# Patient Record
Sex: MALE | Race: WHITE | NOT HISPANIC OR LATINO | Employment: OTHER | URBAN - METROPOLITAN AREA
[De-identification: names, ages, dates, MRNs, and addresses within clinical notes are randomized per-mention and may not be internally consistent; named-entity substitution may affect disease eponyms.]

---

## 2018-01-23 ENCOUNTER — OFFICE VISIT (OUTPATIENT)
Dept: FAMILY MEDICINE | Facility: CLINIC | Age: 63
End: 2018-01-23
Payer: COMMERCIAL

## 2018-01-23 VITALS
BODY MASS INDEX: 26.68 KG/M2 | DIASTOLIC BLOOD PRESSURE: 84 MMHG | HEART RATE: 75 BPM | RESPIRATION RATE: 16 BRPM | TEMPERATURE: 98.2 F | SYSTOLIC BLOOD PRESSURE: 118 MMHG | OXYGEN SATURATION: 100 % | WEIGHT: 197 LBS | HEIGHT: 72 IN

## 2018-01-23 DIAGNOSIS — E78.5 HYPERLIPIDEMIA WITH TARGET LDL LESS THAN 130: ICD-10-CM
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
## 2018-01-23 DIAGNOSIS — Z00.00 ROUTINE GENERAL MEDICAL EXAMINATION AT A HEALTH CARE FACILITY: Primary | ICD-10-CM

## 2018-01-23 DIAGNOSIS — M25.512 CHRONIC PAIN OF BOTH SHOULDERS: ICD-10-CM

## 2018-01-23 DIAGNOSIS — G89.29 CHRONIC PAIN OF BOTH SHOULDERS: ICD-10-CM

## 2018-01-23 DIAGNOSIS — M25.511 CHRONIC PAIN OF BOTH SHOULDERS: ICD-10-CM

## 2018-01-23 DIAGNOSIS — Z12.5 SCREENING FOR PROSTATE CANCER: ICD-10-CM

## 2018-01-23 LAB
ANION GAP SERPL CALCULATED.3IONS-SCNC: 9 MMOL/L (ref 3–14)
BUN SERPL-MCNC: 14 MG/DL (ref 7–30)
CALCIUM SERPL-MCNC: 9 MG/DL (ref 8.5–10.1)
CHLORIDE SERPL-SCNC: 105 MMOL/L (ref 94–109)
CHOLEST SERPL-MCNC: 177 MG/DL
CO2 SERPL-SCNC: 26 MMOL/L (ref 20–32)
CREAT SERPL-MCNC: 0.97 MG/DL (ref 0.66–1.25)
GFR SERPL CREATININE-BSD FRML MDRD: 78 ML/MIN/1.7M2
GLUCOSE SERPL-MCNC: 74 MG/DL (ref 70–99)
HDLC SERPL-MCNC: 61 MG/DL
LDLC SERPL CALC-MCNC: 108 MG/DL
NONHDLC SERPL-MCNC: 116 MG/DL
POTASSIUM SERPL-SCNC: 4.1 MMOL/L (ref 3.4–5.3)
PSA SERPL-ACNC: 0.63 UG/L (ref 0–4)
SODIUM SERPL-SCNC: 140 MMOL/L (ref 133–144)
TRIGL SERPL-MCNC: 42 MG/DL

## 2018-01-23 PROCEDURE — 80048 BASIC METABOLIC PNL TOTAL CA: CPT | Performed by: PHYSICIAN ASSISTANT

## 2018-01-23 PROCEDURE — 80061 LIPID PANEL: CPT | Performed by: PHYSICIAN ASSISTANT

## 2018-01-23 PROCEDURE — 36415 COLL VENOUS BLD VENIPUNCTURE: CPT | Performed by: PHYSICIAN ASSISTANT

## 2018-01-23 PROCEDURE — 86618 LYME DISEASE ANTIBODY: CPT | Performed by: PHYSICIAN ASSISTANT

## 2018-01-23 PROCEDURE — 99396 PREV VISIT EST AGE 40-64: CPT | Performed by: PHYSICIAN ASSISTANT

## 2018-01-23 PROCEDURE — G0103 PSA SCREENING: HCPCS | Performed by: PHYSICIAN ASSISTANT

## 2018-01-23 NOTE — LETTER
January 25, 2018      Chao Quezada  [address] [place] [address]        Dear Chao,    We are writing to inform you of your test results.      Your lyme disease screening came back negative. Your PSA was nice and normal as was your cholesterol numbers and kidney function and blood sugar.       Resulted Orders   Lipid panel reflex to direct LDL Fasting   Result Value Ref Range    Cholesterol 177 <200 mg/dL    Triglycerides 42 <150 mg/dL      Comment:      Fasting specimen    HDL Cholesterol 61 >39 mg/dL    LDL Cholesterol Calculated 108 (H) <100 mg/dL      Comment:      Above desirable:  100-129 mg/dl  Borderline High:  130-159 mg/dL  High:             160-189 mg/dL  Very high:       >189 mg/dl      Non HDL Cholesterol 116 <130 mg/dL   PSA, screen   Result Value Ref Range    PSA 0.63 0 - 4 ug/L      Comment:      Assay Method:  Chemiluminescence using Siemens Vista analyzer   Basic metabolic panel  (Ca, Cl, CO2, Creat, Gluc, K, Na, BUN)   Result Value Ref Range    Sodium 140 133 - 144 mmol/L    Potassium 4.1 3.4 - 5.3 mmol/L    Chloride 105 94 - 109 mmol/L    Carbon Dioxide 26 20 - 32 mmol/L    Anion Gap 9 3 - 14 mmol/L    Glucose 74 70 - 99 mg/dL      Comment:      Fasting specimen    Urea Nitrogen 14 7 - 30 mg/dL    Creatinine 0.97 0.66 - 1.25 mg/dL    GFR Estimate 78 >60 mL/min/1.7m2      Comment:      Non  GFR Calc    GFR Estimate If Black >90 >60 mL/min/1.7m2      Comment:       GFR Calc    Calcium 9.0 8.5 - 10.1 mg/dL   Lyme Disease Latasha with reflex to WB Serum   Result Value Ref Range    Lyme Disease Antibodies Serum 0.07 0.00 - 0.89      Comment:      Negative, Absence of detectable Borrelia burdorferi antibodies. A negative   result does not exclude the possibility of Borrelia burgdorferi infection. If   early Lyme disease is suspected, a second sample should be collected and   tested 2 to 4 weeks later.         If you have any questions or concerns, please  call the clinic at the number listed above.       Sincerely,    Sony Combs PA-C/pete

## 2018-01-23 NOTE — PROGRESS NOTES
SUBJECTIVE:   CC: Chao Quezada is an 62 year old male who presents for preventative health visit.     Healthy Habits:    Do you get at least three servings of calcium containing foods daily (dairy, green leafy vegetables, etc.)? yes    Amount of exercise or daily activities, outside of work: 0 day(s) per week    Problems taking medications regularly not applicable    Medication side effects: No    Have you had an eye exam in the past two years? no    Do you see a dentist twice per year? yes    Do you have sleep apnea, excessive snoring or daytime drowsiness?no       Tick bite last summer  Noting some shoulder pain with activity.       Today's PHQ-2 Score:   PHQ-2 ( 1999 Pf[employer]) 1/23/2018 10/27/2016   Q1: Little interest or pleasure in doing things 0 0   Q2: Feeling down, depressed or hopeless 0 0   PHQ-2 Score 0 0         Abuse: Current or Past(Physical, Sexual or Emotional)- No  Do you feel safe in your environment - Yes  Social History   Substance Use Topics     Smoking status: Never Smoker     Smokeless tobacco: Never Used     Alcohol use Yes      Comment: rare      If you drink alcohol do you typically have >3 drinks per day or >7 drinks per week? No                      Last PSA:   PSA   Date Value Ref Range Status   10/27/2016 0.73 0 - 4 ug/L Final     Comment:     Assay Method:  Chemiluminescence using Siemens Vista analyzer       Reviewed orders with patient. Reviewed health maintenance and updated orders accordingly - Yes  BP Readings from Last 3 Encounters:   01/23/18 118/84   11/30/16 126/88   10/27/16 126/84    Wt Readings from Last 3 Encounters:   01/23/18 197 lb (89.4 kg)   10/27/16 202 lb 9.6 oz (91.9 kg)   02/11/16 195 lb (88.5 kg)                      Reviewed and updated as needed this visit by clinical staffTobacco  Allergies  Meds  Med Hx  Surg Hx  Fam Hx  Soc Hx        Reviewed and updated as needed this visit by Provider              ROS:  C: NEGATIVE for fever, chills, change in  weight  I: NEGATIVE for worrisome rashes, moles or lesions  E: NEGATIVE for vision changes or irritation  ENT: NEGATIVE for ear, mouth and throat problems  R: NEGATIVE for significant cough or SOB  CV: NEGATIVE for chest pain, palpitations or peripheral edema  GI: NEGATIVE for nausea, abdominal pain, heartburn, or change in bowel habits   male: negative for dysuria, hematuria, decreased urinary stream, erectile dysfunction, urethral discharge  M: NEGATIVE for significant arthralgias or myalgia  N: NEGATIVE for weakness, dizziness or paresthesias  P: NEGATIVE for changes in mood or affect    OBJECTIVE:   /84  Pulse 75  Temp 98.2  F (36.8  C) (Tympanic)  Resp 16  Ht 6' (1.829 m)  Wt 197 lb (89.4 kg)  SpO2 100%  BMI 26.72 kg/m2  EXAM:  GENERAL: healthy, alert and no distress  EYES: Eyes grossly normal to inspection, PERRL and conjunctivae and sclerae normal  HENT: ear canals and TM's normal, nose and mouth without ulcers or lesions  NECK: no adenopathy, no asymmetry, masses, or scars and thyroid normal to palpation  RESP: lungs clear to auscultation - no rales, rhonchi or wheezes  CV: regular rate and rhythm, normal S1 S2, no S3 or S4, no murmur, click or rub, no peripheral edema and peripheral pulses strong  ABDOMEN: soft, nontender, no hepatosplenomegaly, no masses and bowel sounds normal  MS: no gross musculoskeletal defects noted, no edema  SKIN: no suspicious lesions or rashes  NEURO: Normal strength and tone, mentation intact and speech normal  PSYCH: mentation appears normal, affect normal/bright  Shoulder exam shows positive impingement signs are present with pain at high arc of abduction and forward flexion on bilateral. There is tenderness of the anterolateral shoulder bilaterally.      ASSESSMENT/PLAN:       ICD-10-CM    1. Routine general medical examination at a health care facility Z00.00 Basic metabolic panel  (Ca, Cl, CO2, Creat, Gluc, K, Na, BUN)   2. Hyperlipidemia with target LDL less  than 130 E78.5 Lipid panel reflex to direct LDL Fasting   3. Screening for prostate cancer Z12.5 PSA, screen   4. Tick bite, initial encounter W57.XXXA Lyme Disease Latasha with reflex to WB Serum   5. Chronic pain of both shoulders M25.511     G89.29     M25.512        COUNSELING:  Reviewed preventive health counseling, as reflected in patient instructions       Regular exercise       Healthy diet/nutrition       reports that he has never smoked. He has never used smokeless tobacco.      Estimated body mass index is 26.72 kg/(m^2) as calculated from the following:    Height as of this encounter: 6' (1.829 m).    Weight as of this encounter: 197 lb (89.4 kg).   Weight management plan: Discussed healthy diet and exercise guidelines and patient will follow up in 12 months in clinic to re-evaluate.    Counseling Resources:  ATP IV Guidelines  Pooled Cohorts Equation Calculator  FRAX Risk Assessment  ICSI Preventive Guidelines  Dietary Guidelines for Americans, 2010  USDA's MyPlate  ASA Prophylaxis  Lung CA Screening    Sony Combs PA-C  [facility]

## 2018-01-23 NOTE — MR AVS SNAPSHOT
After Visit Summary   1/23/2018    Chao Quezada    MRN: [id number]           Patient Information     Date Of Birth          1955        Visit Information        Provider Department      1/23/2018 2:20 PM Sony Combs PA-C [facility]        Today's Diagnoses     Routine general medical examination at a health care facility    -  1    Hyperlipidemia with target LDL less than 130        Screening for prostate cancer        Tick bite, initial encounter        Chronic pain of both shoulders          Care Instructions      Preventive Health Recommendations  Male Ages 50 - 64    Yearly exam:             See your health care provider every year in order to  o   Review health changes.   o   Discuss preventive care.    o   Review your medicines if your doctor has prescribed any.     Have a cholesterol test every 5 years, or more frequently if you are at risk for high cholesterol/heart disease.     Have a diabetes test (fasting glucose) every three years. If you are at risk for diabetes, you should have this test more often.     Have a colonoscopy at age 50, or have a yearly FIT test (stool test). These exams will check for colon cancer.      Talk with your health care provider about whether or not a prostate cancer screening test (PSA) is right for you.    You should be tested each year for STDs (sexually transmitted diseases), if you re at risk.     Shots: Get a flu shot each year. Get a tetanus shot every 10 years.     Nutrition:    Eat at least 5 servings of fruits and vegetables daily.     Eat whole-grain bread, whole-wheat pasta and brown rice instead of white grains and rice.     Talk to your provider about Calcium and Vitamin D.     Lifestyle    Exercise for at least 150 minutes a week (30 minutes a day, 5 days a week). This will help you control your weight and prevent disease.     Limit alcohol to one drink per day.     No smoking.     Wear sunscreen to prevent skin cancer.  "    See your dentist every six months for an exam and cleaning.     See your eye doctor every 1 to 2 years.            Follow-ups after your visit        Who to contact     Normal or non-critical lab and imaging results will be communicated to you by CoachUphart, letter or phone within 4 business days after the clinic has received the results. If you do not hear from us within 7 days, please contact the clinic through CoachUphart or phone. If you have a critical or abnormal lab result, we will notify you by phone as soon as possible.  Submit refill requests through SkimaTalk or call your pharmacy and they will forward the refill request to us. Please allow 3 business days for your refill to be completed.          If you need to speak with a  for additional information , please call: [phone]             Additional Information About Your Visit        SkimaTalk Information     SkimaTalk lets you send messages to your doctor, view your test results, renew your prescriptions, schedule appointments and more. To sign up, go to [email or web address] . Click on \"Log in\" on the left side of the screen, which will take you to the Welcome page. Then click on \"Sign up Now\" on the right side of the page.     You will be asked to enter the access code listed below, as well as some personal information. Please follow the directions to create your username and password.     Your access code is: [email or web address]  Expires: 2018  3:03 PM     Your access code will  in 90 days. If you need help or a new code, please call your [place] clinic or [phone].        Care EveryWhere ID     This is your Care EveryWhere ID. This could be used by other organizations to access your [place] medical records  [id number]        Your Vitals Were     Pulse Temperature Respirations Height Pulse Oximetry BMI (Body Mass Index)    75 98.2  F (36.8  C) (Tympanic) 16 6' (1.829 m) 100% 26.72 kg/m2       Blood Pressure from Last 3 " Encounters:   01/23/18 118/84   11/30/16 126/88   10/27/16 126/84    Weight from Last 3 Encounters:   01/23/18 197 lb (89.4 kg)   10/27/16 202 lb 9.6 oz (91.9 kg)   02/11/16 195 lb (88.5 kg)              We Performed the Following     Basic metabolic panel  (Ca, Cl, CO2, Creat, Gluc, K, Na, BUN)     Lipid panel reflex to direct LDL Fasting     Lyme Disease Latasha with reflex to WB Serum     PSA, screen          Today's Medication Changes          These changes are accurate as of: 1/23/18  3:04 PM.  If you have any questions, ask your nurse or doctor.               Stop taking these medicines if you haven't already. Please contact your care team if you have questions.     HYDROcodone-acetaminophen 5-325 MG per tablet   Commonly known as:  NORCO   Stopped by:  Sony Combs PA-C                    Primary Care Provider Office Phone # Fax #    Sony Combs PA-C [phone]       [address] [facility] [address]        Equal Access to Services     [facility]: Hadii aad ku hadasho Soomaali, waaxda luqadaha, qaybta kaalmada adeemanuel, aisha gramajo . So [facility] [phone].    ATENCIÓN: Si habla español, tiene a ndiaye disposición servicios gratuitos de asistencia lingüística. [facility] [phone].    We comply with applicable federal civil rights laws and [place] laws. We do not discriminate on the basis of race, color, national origin, age, disability, sex, sexual orientation, or gender identity.            Thank you!     Thank you for choosing [facility]  for your care. Our goal is always to provide you with excellent care. Hearing back from our patients is one way we can continue to improve our services. Please take a few minutes to complete the written survey that you may receive in the mail after your visit with us. Thank you!             Your Updated Medication List - Protect others around you: Learn how to safely use, store and throw away your  medicines at www.disposemymeds.org.      Notice  As of 1/23/2018  3:04 PM    You have not been prescribed any medications.

## 2018-01-24 LAB — B BURGDOR IGG+IGM SER QL: 0.07 (ref 0–0.89)

## 2018-03-23 DIAGNOSIS — G44.209 TENSION HEADACHE: Primary | ICD-10-CM

## 2018-03-23 RX ORDER — BUTALBITAL, ACETAMINOPHEN AND CAFFEINE 50; 325; 40 MG/1; MG/1; MG/1
1 TABLET ORAL EVERY 4 HOURS PRN
Qty: 28 TABLET | Refills: 1 | Status: SHIPPED | OUTPATIENT
Start: 2018-03-23 | End: 2020-11-25

## 2018-04-11 ENCOUNTER — TRANSFERRED RECORDS (OUTPATIENT)
Dept: HEALTH INFORMATION MANAGEMENT | Facility: CLINIC | Age: 63
End: 2018-04-11

## 2018-09-11 DIAGNOSIS — M54.12 CERVICAL RADICULOPATHY: ICD-10-CM

## 2018-09-11 DIAGNOSIS — M50.30 DDD (DEGENERATIVE DISC DISEASE), CERVICAL: Primary | ICD-10-CM

## 2018-10-02 ENCOUNTER — OFFICE VISIT (OUTPATIENT)
Dept: NEUROSURGERY | Facility: CLINIC | Age: 63
End: 2018-10-02
Payer: COMMERCIAL

## 2018-10-02 VITALS
DIASTOLIC BLOOD PRESSURE: 88 MMHG | TEMPERATURE: 97 F | OXYGEN SATURATION: 99 % | HEART RATE: 55 BPM | SYSTOLIC BLOOD PRESSURE: 141 MMHG

## 2018-10-02 DIAGNOSIS — M54.2 NECK PAIN: Primary | ICD-10-CM

## 2018-10-02 PROCEDURE — 99243 OFF/OP CNSLTJ NEW/EST LOW 30: CPT | Performed by: NEUROLOGICAL SURGERY

## 2018-10-02 RX ORDER — METHYLPREDNISOLONE 4 MG
TABLET, DOSE PACK ORAL
Qty: 21 TABLET | Refills: 0 | Status: SHIPPED | OUTPATIENT
Start: 2018-10-02 | End: 2020-11-25

## 2018-10-02 RX ORDER — METHOCARBAMOL 750 MG/1
750 TABLET, FILM COATED ORAL 3 TIMES DAILY
Qty: 90 TABLET | Refills: 0 | Status: SHIPPED | OUTPATIENT
Start: 2018-10-02 | End: 2020-11-25

## 2018-10-02 ASSESSMENT — PAIN SCALES - GENERAL: PAINLEVEL: MILD PAIN (2)

## 2018-10-02 NOTE — LETTER
10/2/2018         RE: Chao Quezada  [address] [place] [address]        Dear Colleague,    Thank you for referring your patient, Chao Quezada, to the [facility]. Please see a copy of my visit note below.       Neurosurgery Consult Note   [facility]        CC: Neck and interscapular pain    Primary care Provider: Sony Combs    I was asked to see the patient by:  Sony Combs PA-C  [address] [place] [address]      [place]: Chao Quezada is a 62 year old male that presents to clinic with a complaint of neck and interscapular pain without radiculopathy.  Patient describes having pain in his neck that extends down to his shoulders in the interscapular region which also causes headaches.  He is tried physical therapy without traction and 2 epidural steroid injections. He has no radicular pain      Past Medical History:   Diagnosis Date     Backache, unspecified      Dermatophytosis of nail 1998     Headache        Past Surgical History:   Procedure Laterality Date     C ANESTH,KNEE JOINT; NOS  1973    right knee, with hx of dislocaton of knee cap     COLONOSCOPY WITH CO2 INSUFFLATION N/A 11/30/2016    Procedure: COLONOSCOPY WITH CO2 INSUFFLATION;  Surgeon: Duane, William Charles, MD;  Location: [facility] OR     HC COLONOSCOPY W/WO BRUSH/WASH  1/24/2005      HC EXPLORATION ANKLE JOINT  1994    right ankle fracture with pins and screws       Current Outpatient Prescriptions   Medication     butalbital-acetaminophen-caffeine (FIORICET/ESGIC) -40 MG per tablet     No current facility-administered medications for this visit.        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[employer]     Social History Main Topics     Smoking status: Never  Smoker     Smokeless tobacco: Never Used      Comment: never smoked     Alcohol use Yes      Comment: rare     Drug use: None     Sexual activity: Yes     Partners: Female     Other Topics Concern     None     Social History Narrative       Family History   Problem Relation Age of Onset     Cancer Father      throat, prostate     Prostate Cancer Father      Diabetes Mother          Review Of Systems  Skin: negative  Eyes: negative  Ears/Nose/Throat: negative  Respiratory: No shortness of breath, dyspnea on exertion, cough, or hemoptysis  Cardiovascular: negative  Gastrointestinal: negative  Genitourinary: negative  Musculoskeletal: as above  Neurologic: as above  Psychiatric: negative  Hematologic/Lymphatic/Immunologic: negative  Endocrine: negative    B/P: 141/88, T: 97, P: 55, R: Data Unavailable    Examination:  Normal affect and mood  No obvious labored respiration  No skin lesions noted   No obvious pitting edema  No abnormal psychiatric behavior  No obvious musculoskeletal abnormalities   Awake  Alert  Oriented x 3  Speech clear  Cranial nerves II - XII intact  Face symmetric  Tongue midline  Neck nontender  Normal ROM of neck  Motor exam symmetric motor strength in the bilateral upper extremities without weakness  Sensation intact  Clonus give  DTR 1 + throughout  Arias's sign negative  Spurling's sign negative  Ambulation stable    Imaging:   MRI cervical spine reveals cervical spondylosis without stenosis      Assessment/Plan:   We will try physical therapy with traction, Medrol Dosepak and a muscle relaxer to take in the evenings and night.  Patient does not need surgical intervention.        Juancarlos Benson MD, MS, FAANS  Neurosurgeon  [facility]  [address] [place] [address]  Tel [phone]    Again, thank you for allowing me to participate in the care of your patient.        Sincerely,        Juancarlos Benson MD

## 2018-10-02 NOTE — NURSING NOTE
Chao Quezada is a 62 year old male who presents for:  Chief Complaint   Patient presents with     Neurologic Problem     referral from JO[facility] for cervical DDD        Vitals:    There were no vitals filed for this visit.    BMI:  Estimated body mass index is 26.72 kg/(m^2) as calculated from the following:    Height as of 1/23/18: 6' (1.829 m).    Weight as of 1/23/18: 197 lb (89.4 kg).    Pain Score:  Data Unavailable        Jacinda Eden CMA, AAS

## 2018-10-02 NOTE — PROGRESS NOTES
Neurosurgery Consult Note   [facility]        CC: Neck and interscapular pain    Primary care Provider: Sony Combs    I was asked to see the patient by:  Sony Combs PA-C  [address] [place] [address]      [place]: Chao Quezada is a 62 year old male that presents to clinic with a complaint of neck and interscapular pain without radiculopathy.  Patient describes having pain in his neck that extends down to his shoulders in the interscapular region which also causes headaches.  He is tried physical therapy without traction and 2 epidural steroid injections. He has no radicular pain      Past Medical History:   Diagnosis Date     Backache, unspecified      Dermatophytosis of nail 1998     Headache        Past Surgical History:   Procedure Laterality Date     C ANESTH,KNEE JOINT; NOS  1973    right knee, with hx of dislocaton of knee cap     COLONOSCOPY WITH CO2 INSUFFLATION N/A 11/30/2016    Procedure: COLONOSCOPY WITH CO2 INSUFFLATION;  Surgeon: Duane, William Charles, MD;  Location: [facility] OR     HC COLONOSCOPY W/WO BRUSH/WASH  1/24/2005      HC EXPLORATION ANKLE JOINT  1994    right ankle fracture with pins and screws       Current Outpatient Prescriptions   Medication     butalbital-acetaminophen-caffeine (FIORICET/ESGIC) -40 MG per tablet     No current facility-administered medications for this visit.        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[employer]     Social History Main Topics     Smoking status: Never Smoker     Smokeless tobacco: Never Used      Comment: never smoked     Alcohol use Yes      Comment: rare     Drug use: None     Sexual activity: Yes     Partners: Female     Other Topics Concern     None     Social History Narrative        Family History   Problem Relation Age of Onset     Cancer Father      throat, prostate     Prostate Cancer Father      Diabetes Mother          Review Of Systems  Skin: negative  Eyes: negative  Ears/Nose/Throat: negative  Respiratory: No shortness of breath, dyspnea on exertion, cough, or hemoptysis  Cardiovascular: negative  Gastrointestinal: negative  Genitourinary: negative  Musculoskeletal: as above  Neurologic: as above  Psychiatric: negative  Hematologic/Lymphatic/Immunologic: negative  Endocrine: negative    B/P: 141/88, T: 97, P: 55, R: Data Unavailable    Examination:  Normal affect and mood  No obvious labored respiration  No skin lesions noted   No obvious pitting edema  No abnormal psychiatric behavior  No obvious musculoskeletal abnormalities   Awake  Alert  Oriented x 3  Speech clear  Cranial nerves II - XII intact  Face symmetric  Tongue midline  Neck nontender  Normal ROM of neck  Motor exam symmetric motor strength in the bilateral upper extremities without weakness  Sensation intact  Clonus give  DTR 1 + throughout  Arias's sign negative  Spurling's sign negative  Ambulation stable    Imaging:   MRI cervical spine reveals cervical spondylosis without stenosis      Assessment/Plan:   We will try physical therapy with traction, Medrol Dosepak and a muscle relaxer to take in the evenings and night.  Patient does not need surgical intervention.        Juancarlos Benson MD, MS, FAANS  Neurosurgeon  [facility]  [address] [place] [address]  [address]  Tel [phone]

## 2018-10-02 NOTE — PATIENT INSTRUCTIONS
1. Physical Therapy with traction  2. Rx for medrol dose pack and Robaxin    Please call our clinic with any questions or concerns: [phone]

## 2018-10-02 NOTE — MR AVS SNAPSHOT
After Visit Summary   10/2/2018    Chao Quezada    MRN: [id number]           Patient Information     Date Of Birth          1955        Visit Information        Provider Department      10/2/2018 2:50 PM Juancarlos Benson MD [facility]        Today's Diagnoses     Cervical radiculopathy    -  1      Care Instructions    1. Physical Therapy with traction  2. Rx for medrol dose pack and Robaxin    Please call our clinic with any questions or concerns: [phone]              Follow-ups after your visit        Additional Services     [facility] PT, HAND, AND CHIROPRACTIC REFERRAL       Physical Therapy, Hand Therapy and Chiropractic Care are available through:  *[facility]  *Hand Therapy (Occupational Therapy or Physical Therapy)  *[facility]    Call one number to schedule at any of the above locations: [phone].    Physical therapy, Hand therapy and/or Chiropractic care has been recommended by your physician as an excellent treatment option to reduce pain and help people return to normal activities, including sports.  Therapy and/or chiropractic care services are a great complement or alternative to expensive and invasive surgery, injections, or long-term use of prescription medications. The primary goal is to identify the underlying problem and provide you the tools to manage your condition on your own.     Please be aware that coverage of these services is subject to the terms and limitations of your health insurance plan.  Call member services at your health plan with any benefit or coverage questions.      Please bring the following to your appointment:  *Your personal calendar for scheduling future appointments  *Comfortable clothing                  Future tests that were ordered for you today     Open Future Orders        Priority Expected Expires Ordered    [facility] PT, HAND, AND CHIROPRACTIC REFERRAL Routine  10/2/2019  "10/2/2018            Who to contact     If you have questions or need follow up information about today's clinic visit or your schedule please contact [facility] directly at [phone].  Normal or non-critical lab and imaging results will be communicated to you by MyChart, letter or phone within 4 business days after the clinic has received the results. If you do not hear from us within 7 days, please contact the clinic through MyChart or phone. If you have a critical or abnormal lab result, we will notify you by phone as soon as possible.  Submit refill requests through [employer] or call your pharmacy and they will forward the refill request to us. Please allow 3 business days for your refill to be completed.          Additional Information About Your Visit        [facility] Data Source Information     [employer] lets you send messages to your doctor, view your test results, renew your prescriptions, schedule appointments and more. To sign up, go to [email or web address] . Click on \"Log in\" on the left side of the screen, which will take you to the Welcome page. Then click on \"Sign up Now\" on the right side of the page.     You will be asked to enter the access code listed below, as well as some personal information. Please follow the directions to create your username and password.     Your access code is: [email or web address]  Expires: 2018  3:27 PM     Your access code will  in 90 days. If you need help or a new code, please call your [facility] clinic or [phone].        Care EveryWhere ID     This is your Care EveryWhere ID. This could be used by other organizations to access your [facility] medical records  [id number]        Your Vitals Were     Pulse Temperature Pulse Oximetry             55 97  F (36.1  C) (Oral) 99%          Blood Pressure from Last 3 Encounters:   10/02/18 141/88   18 118/84   16 126/88    Weight from Last 3 Encounters:   18 197 lb (89.4 kg)   10/27/16 202 lb 9.6 oz " (91.9 kg)   02/11/16 195 lb (88.5 kg)                 Today's Medication Changes          These changes are accurate as of 10/2/18  3:27 PM.  If you have any questions, ask your nurse or doctor.               Start taking these medicines.        Dose/Directions    methocarbamol 750 MG tablet   Commonly known as:  ROBAXIN   Used for:  Cervical radiculopathy   Started by:  Juancarlos Benson MD        Dose:  750 mg   Take 1 tablet (750 mg) by mouth 3 times daily   Quantity:  90 tablet   Refills:  0       methylPREDNISolone 4 MG tablet   Commonly known as:  MEDROL DOSEPAK   Used for:  Cervical radiculopathy   Started by:  Juancarlos Benson MD        Follow package instructions   Quantity:  21 tablet   Refills:  0            Where to get your medicines      Some of these will need a paper prescription and others can be bought over the counter.  Ask your nurse if you have questions.     Bring a paper prescription for each of these medications     methocarbamol 750 MG tablet    methylPREDNISolone 4 MG tablet                Primary Care Provider Office Phone # Fax #    Sony Combs PA-C [phone]       [address] [facility] [address]        Equal Access to Services     [facility]: Hadii aad ku hadasho Soomaali, waaxda luqadaha, qaybta kaalmada adeegyada, aisha rgamajo . So [facility] [phone].    ATENCIÓN: Si habla español, tiene a ndiaye disposición servicios gratuitos de asistencia lingüística. [facility] [phone].    We comply with applicable federal civil rights laws and [place] laws. We do not discriminate on the basis of race, color, national origin, age, disability, sex, sexual orientation, or gender identity.            Thank you!     Thank you for choosing [facility]  for your care. Our goal is always to provide you with excellent care. Hearing back from our patients is one way we can continue to improve our services. Please take  a few minutes to complete the written survey that you may receive in the mail after your visit with us. Thank you!             Your Updated Medication List - Protect others around you: Learn how to safely use, store and throw away your medicines at www.disposemymeds.org.          This list is accurate as of 10/2/18  3:27 PM.  Always use your most recent med list.                   Brand Name Dispense Instructions for use Diagnosis    butalbital-acetaminophen-caffeine -40 MG per tablet    FIORICET/ESGIC    28 tablet    Take 1 tablet by mouth every 4 hours as needed    Tension headache       methocarbamol 750 MG tablet    ROBAXIN    90 tablet    Take 1 tablet (750 mg) by mouth 3 times daily    Cervical radiculopathy       methylPREDNISolone 4 MG tablet    MEDROL DOSEPAK    21 tablet    Follow package instructions    Cervical radiculopathy

## 2018-10-05 ENCOUNTER — TRANSFERRED RECORDS (OUTPATIENT)
Dept: HEALTH INFORMATION MANAGEMENT | Facility: CLINIC | Age: 63
End: 2018-10-05

## 2018-12-12 ENCOUNTER — TRANSFERRED RECORDS (OUTPATIENT)
Dept: HEALTH INFORMATION MANAGEMENT | Facility: CLINIC | Age: 63
End: 2018-12-12

## 2019-02-01 ENCOUNTER — TRANSFERRED RECORDS (OUTPATIENT)
Dept: HEALTH INFORMATION MANAGEMENT | Facility: CLINIC | Age: 64
End: 2019-02-01

## 2020-11-25 ENCOUNTER — OFFICE VISIT (OUTPATIENT)
Dept: FAMILY MEDICINE | Facility: CLINIC | Age: 65
End: 2020-11-25
Payer: COMMERCIAL

## 2020-11-25 VITALS
SYSTOLIC BLOOD PRESSURE: 129 MMHG | TEMPERATURE: 97.2 F | RESPIRATION RATE: 20 BRPM | DIASTOLIC BLOOD PRESSURE: 78 MMHG | WEIGHT: 201 LBS | HEIGHT: 71 IN | OXYGEN SATURATION: 95 % | HEART RATE: 52 BPM | BODY MASS INDEX: 28.14 KG/M2

## 2020-11-25 DIAGNOSIS — Z12.5 SCREENING FOR PROSTATE CANCER: ICD-10-CM

## 2020-11-25 DIAGNOSIS — B35.1 DERMATOPHYTOSIS OF NAIL: ICD-10-CM

## 2020-11-25 DIAGNOSIS — Z00.00 ENCOUNTER FOR MEDICARE ANNUAL WELLNESS EXAM: Primary | ICD-10-CM

## 2020-11-25 DIAGNOSIS — Z13.220 SCREENING FOR HYPERLIPIDEMIA: ICD-10-CM

## 2020-11-25 DIAGNOSIS — G44.209 TENSION HEADACHE: ICD-10-CM

## 2020-11-25 LAB
CHOLEST SERPL-MCNC: 203 MG/DL
HDLC SERPL-MCNC: 52 MG/DL
LDLC SERPL CALC-MCNC: 139 MG/DL
NONHDLC SERPL-MCNC: 151 MG/DL
PSA SERPL-ACNC: 0.99 UG/L (ref 0–4)
TRIGL SERPL-MCNC: 62 MG/DL

## 2020-11-25 PROCEDURE — 80061 LIPID PANEL: CPT | Performed by: PHYSICIAN ASSISTANT

## 2020-11-25 PROCEDURE — 36415 COLL VENOUS BLD VENIPUNCTURE: CPT | Performed by: PHYSICIAN ASSISTANT

## 2020-11-25 PROCEDURE — G0103 PSA SCREENING: HCPCS | Performed by: PHYSICIAN ASSISTANT

## 2020-11-25 PROCEDURE — 99397 PER PM REEVAL EST PAT 65+ YR: CPT | Performed by: PHYSICIAN ASSISTANT

## 2020-11-25 RX ORDER — BUTALBITAL, ACETAMINOPHEN AND CAFFEINE 50; 325; 40 MG/1; MG/1; MG/1
1 TABLET ORAL EVERY 4 HOURS PRN
Qty: 28 TABLET | Refills: 1 | Status: SHIPPED | OUTPATIENT
Start: 2020-11-25 | End: 2023-02-01

## 2020-11-25 RX ORDER — TERBINAFINE HYDROCHLORIDE 250 MG/1
1 TABLET ORAL DAILY
Qty: 90 TABLET | Refills: 0 | Status: SHIPPED | OUTPATIENT
Start: 2020-11-25 | End: 2023-02-01

## 2020-11-25 ASSESSMENT — MIFFLIN-ST. JEOR: SCORE: 1713.6

## 2020-11-25 NOTE — LETTER
November 30, 2020      Chao Quezada  [address] [place] [address]        Dear ,    We are writing to inform you of your test results.    Your psa came back nice and normal.   Your cholesterol numbers came back in the high normal range. To help lower your numbers , really work on a Lower fat, higher fiber diet and consistent exercise.     Resulted Orders   Lipid panel reflex to direct LDL Fasting   Result Value Ref Range    Cholesterol 203 (H) <200 mg/dL      Comment:      Desirable:       <200 mg/dl    Triglycerides 62 <150 mg/dL      Comment:      Fasting specimen    HDL Cholesterol 52 >39 mg/dL    LDL Cholesterol Calculated 139 (H) <100 mg/dL      Comment:      Above desirable:  100-129 mg/dl  Borderline High:  130-159 mg/dL  High:             160-189 mg/dL  Very high:       >189 mg/dl      Non HDL Cholesterol 151 (H) <130 mg/dL      Comment:      Above Desirable:  130-159 mg/dl  Borderline high:  160-189 mg/dl  High:             190-219 mg/dl  Very high:       >219 mg/dl     PSA, screen   Result Value Ref Range    PSA 0.99 0 - 4 ug/L      Comment:      Assay Method:  Chemiluminescence using Siemens Vista analyzer       If you have any questions or concerns, please call the clinic at the number listed above.       Sincerely,        Sony Combs PA-C/chang

## 2020-11-25 NOTE — PATIENT INSTRUCTIONS
Patient Education   Personalized Prevention Plan  You are due for the preventive services outlined below.  Your care team is available to assist you in scheduling these services.  If you have already completed any of these items, please share that information with your care team to update in your medical record.  Health Maintenance Due   Topic Date Due     HIV Screening  12/27/1969     Zoster (Shingles) Vaccine (1 of 2) 12/27/2004     Cholesterol Lab  01/23/2019     FALL RISK ASSESSMENT  12/27/2019     Pneumococcal Vaccine (1 of 1 - PPSV23) 12/27/2019     AORTIC ANEURYSM SCREENING (SYSTEM ASSIGNED)  12/27/2019     PHQ-2  01/01/2020     Discuss Advance Care Planning  04/21/2020     Flu Vaccine (1) 09/01/2020

## 2020-11-25 NOTE — PROGRESS NOTES
"  SUBJECTIVE:   Chao Quezada is a 64 year old male who presents for Preventive Visit.      Patient has been advised of split billing requirements and indicates understanding: Yes  Are you in the first 12 months of your Medicare Part B coverage?  No    Physical Health:    In general, how would you rate your overall physical health? good    Outside of work, how many days during the week do you exercise? 2-3 days/week    Outside of work, approximately how many minutes a day do you exercise?15-30 minutes    If you drink alcohol do you typically have >3 drinks per day or >7 drinks per week? Not Applicable    Do you usually eat at least 4 servings of fruit and vegetables a day, include whole grains & fiber and avoid regularly eating high fat or \"junk\" foods? Yes    Do you have any problems taking medications regularly?  No    Do you have any side effects from medications? not applicable    Needs assistance for the following daily activities: no assistance needed    Which of the following safety concerns are present in your home?  none identified     Hearing impairment: Yes, ringing in ears, had this evaluated once, some hearing issues    In the past 6 months, have you been bothered by leaking of urine? no    Mental Health:    In general, how would you rate your overall mental or emotional health? good  PHQ-2 Score:  0    Do you feel safe in your environment? Yes    Have you ever done Advance Care Planning? (For example, a Health Directive, POLST, or a discussion with a medical provider or your loved ones about your wishes): No, advance care planning information given to patient to review.  Patient plans to discuss their wishes with loved ones or provider.      Additional concerns to address?  No    Fall risk: 0  Fallen 2 or more times in the past year?: No  Any fall with injury in the past year?: No    Cognitive Screenin) Repeat 3 items (Leader, Season, Table)    2) Clock draw: NORMAL  3) 3 item recall: Recalls 3 " objects  Results: 3 items recalled: COGNITIVE IMPAIRMENT LESS LIKELY    Mini-CogTM Copyright DAVIAN Miller. Licensed by the author for use in [facility]; reprinted with permission (yelena@.[facility]). All rights reserved.      Do you have sleep apnea, excessive snoring or daytime drowsiness?: no            Reviewed and updated as needed this visit by clinical staff  Tobacco  Allergies  Meds   Med Hx  Surg Hx  Fam Hx  Soc Hx        Reviewed and updated as needed this visit by Provider                Social History     Tobacco Use     Smoking status: Never Smoker     Smokeless tobacco: Never Used     Tobacco comment: never smoked   Substance Use Topics     Alcohol use: Yes     Comment: rare                           Current providers sharing in care for this patient include:   Patient Care Team:  Sony Combs PA-C as PCP - General (Physician Assistant)  Sony Combs PA-C as Assigned PCP    The following health maintenance items are reviewed in Epic and correct as of today:  Health Maintenance   Topic Date Due     HIV SCREENING  12/27/1969     ZOSTER IMMUNIZATION (1 of 2) 12/27/2004     LIPID  01/23/2019     FALL RISK ASSESSMENT  12/27/2019     Pneumococcal Vaccine: 65+ Years (1 of 1 - PPSV23) 12/27/2019     AORTIC ANEURYSM SCREENING (SYSTEM ASSIGNED)  12/27/2019     INFLUENZA VACCINE (1) 09/01/2020     MEDICARE ANNUAL WELLNESS VISIT  11/25/2021     DTAP/TDAP/TD IMMUNIZATION (2 - Td) 10/01/2022     ADVANCE CARE PLANNING  11/25/2025     COLORECTAL CANCER SCREENING  11/30/2026     HEPATITIS C SCREENING  Completed     PHQ-2  Completed     Pneumococcal Vaccine: Pediatrics (0 to 5 Years) and At-Risk Patients (6 to 64 Years)  Aged Out     IPV IMMUNIZATION  Aged Out     MENINGITIS IMMUNIZATION  Aged Out     HEPATITIS B IMMUNIZATION  Aged Out     Lab work is in process  Labs reviewed in EPIC  BP Readings from Last 3 Encounters:   11/25/20 129/78   10/02/18 141/88   01/23/18 118/84    Wt Readings from Last  "3 Encounters:   11/25/20 91.2 kg (201 lb)   01/23/18 89.4 kg (197 lb)   10/27/16 91.9 kg (202 lb 9.6 oz)                  Patient Active Problem List   Diagnosis     Hyperlipidemia with target LDL less than 130     Past Surgical History:   Procedure Laterality Date     C ANESTH,KNEE JOINT; NOS  1973    right knee, with hx of dislocaton of knee cap     COLONOSCOPY WITH CO2 INSUFFLATION N/A 11/30/2016    Procedure: COLONOSCOPY WITH CO2 INSUFFLATION;  Surgeon: Duane, William Charles, MD;  Location: [facility] OR     HC COLONOSCOPY W/WO BRUSH/WASH  1/24/2005      HC EXPLORATION ANKLE JOINT  1994    right ankle fracture with pins and screws       Social History     Tobacco Use     Smoking status: Never Smoker     Smokeless tobacco: Never Used     Tobacco comment: never smoked   Substance Use Topics     Alcohol use: Yes     Comment: rare     Family History   Problem Relation Age of Onset     Cancer Father         throat, prostate     Prostate Cancer Father      Diabetes Mother      Cancer Brother          No current outpatient medications on file.     Allergies   Allergen Reactions     No Known Drug Allergies      Recent Labs   Lab Test 01/23/18  1511 10/27/16  0955 09/04/15  1022 04/21/15  0755   * 115* 133* 159*   HDL 61 54 51 54   TRIG 42 59 64 72   ALT  --  23  --  25   CR 0.97 0.93  --  0.92   GFRESTIMATED 78 82  --  83   GFRESTBLACK >90 >90  African American GFR Calc    --  >90   GFR Calc     POTASSIUM 4.1 4.2  --  4.3          ROS:  Constitutional, HEENT, cardiovascular, pulmonary, GI, , musculoskeletal, neuro, skin, endocrine and psych systems are negative, except as otherwise noted.    OBJECTIVE:   /78   Pulse 52   Temp 97.2  F (36.2  C) (Tympanic)   Resp 20   Ht 1.795 m (5' 10.67\")   Wt 91.2 kg (201 lb)   SpO2 95%   BMI 28.30 kg/m   Estimated body mass index is 28.3 kg/m  as calculated from the following:    Height as of this encounter: 1.795 m (5' 10.67\").    Weight as of this " "encounter: 91.2 kg (201 lb).  EXAM:   GENERAL: healthy, alert and no distress  EYES: Eyes grossly normal to inspection, PERRL and conjunctivae and sclerae normal  HENT: ear canals and TM's normal, nose and mouth without ulcers or lesions  NECK: no adenopathy, no asymmetry, masses, or scars and thyroid normal to palpation  RESP: lungs clear to auscultation - no rales, rhonchi or wheezes  CV: regular rate and rhythm, normal S1 S2, no S3 or S4, no murmur, click or rub, no peripheral edema and peripheral pulses strong  ABDOMEN: soft, nontender, no hepatosplenomegaly, no masses and bowel sounds normal  MS: no gross musculoskeletal defects noted, no edema  SKIN: no suspicious lesions or rashes  NEURO: Normal strength and tone, mentation intact and speech normal  PSYCH: mentation appears normal, affect normal/bright    Diagnostic Test Results:  Labs reviewed in Epic    ASSESSMENT / PLAN:       ICD-10-CM    1. Encounter for Medicare annual wellness exam  Z00.00    2. Screening for hyperlipidemia  Z13.220 Lipid panel reflex to direct LDL Fasting   3. Screening for prostate cancer  Z12.5 PSA, screen       Patient has been advised of split billing requirements and indicates understanding: Yes    COUNSELING:  Reviewed preventive health counseling, as reflected in patient instructions       Regular exercise       Healthy diet/nutrition    Estimated body mass index is 28.3 kg/m  as calculated from the following:    Height as of this encounter: 1.795 m (5' 10.67\").    Weight as of this encounter: 91.2 kg (201 lb).    Weight management plan: Discussed healthy diet and exercise guidelines    He reports that he has never smoked. He has never used smokeless tobacco.    Appropriate preventive services were discussed with this patient, including applicable screening as appropriate for cardiovascular disease, diabetes, osteopenia/osteoporosis, and glaucoma.  As appropriate for age/gender, discussed screening for colorectal cancer, " prostate cancer, breast cancer, and cervical cancer. Checklist reviewing preventive services available has been given to the patient.    Reviewed patients plan of care and provided an AVS. The Basic Care Plan (routine screening as documented in Health Maintenance) for Chao meets the Care Plan requirement. This Care Plan has been established and reviewed with the Patient.    Counseling Resources:  ATP IV Guidelines  Pooled Cohorts Equation Calculator  Breast Cancer Risk Calculator  BRCA-Related Cancer Risk Assessment: FHS-7 Tool  FRAX Risk Assessment  ICSI Preventive Guidelines  Dietary Guidelines for Americans, 2010  USDA's MyPlate  ASA Prophylaxis  Lung CA Screening    BRISA [facility]

## 2021-01-10 ENCOUNTER — HEALTH MAINTENANCE LETTER (OUTPATIENT)
Age: 66
End: 2021-01-10

## 2021-06-16 ENCOUNTER — ANCILLARY PROCEDURE (OUTPATIENT)
Dept: GENERAL RADIOLOGY | Facility: CLINIC | Age: 66
End: 2021-06-16
Attending: PHYSICIAN ASSISTANT
Payer: COMMERCIAL

## 2021-06-16 ENCOUNTER — OFFICE VISIT (OUTPATIENT)
Dept: FAMILY MEDICINE | Facility: CLINIC | Age: 66
End: 2021-06-16
Payer: COMMERCIAL

## 2021-06-16 VITALS
DIASTOLIC BLOOD PRESSURE: 82 MMHG | BODY MASS INDEX: 28.27 KG/M2 | HEART RATE: 54 BPM | WEIGHT: 200.8 LBS | SYSTOLIC BLOOD PRESSURE: 136 MMHG | OXYGEN SATURATION: 95 % | RESPIRATION RATE: 16 BRPM | TEMPERATURE: 97.8 F

## 2021-06-16 DIAGNOSIS — R07.9 CHEST PAIN, UNSPECIFIED TYPE: Primary | ICD-10-CM

## 2021-06-16 DIAGNOSIS — Z13.220 SCREENING FOR HYPERLIPIDEMIA: ICD-10-CM

## 2021-06-16 DIAGNOSIS — E78.5 HYPERLIPIDEMIA LDL GOAL <100: ICD-10-CM

## 2021-06-16 DIAGNOSIS — R07.9 CHEST PAIN, UNSPECIFIED TYPE: ICD-10-CM

## 2021-06-16 LAB
CHOLEST SERPL-MCNC: 203 MG/DL
HDLC SERPL-MCNC: 52 MG/DL
LDLC SERPL CALC-MCNC: 164 MG/DL
NONHDLC SERPL-MCNC: 174 MG/DL
TRIGL SERPL-MCNC: 49 MG/DL
TROPONIN I SERPL-MCNC: <0.015 UG/L (ref 0–0.04)

## 2021-06-16 PROCEDURE — 71046 X-RAY EXAM CHEST 2 VIEWS: CPT | Performed by: RADIOLOGY

## 2021-06-16 PROCEDURE — 93000 ELECTROCARDIOGRAM COMPLETE: CPT | Performed by: PHYSICIAN ASSISTANT

## 2021-06-16 PROCEDURE — 80061 LIPID PANEL: CPT | Performed by: PHYSICIAN ASSISTANT

## 2021-06-16 PROCEDURE — 99214 OFFICE O/P EST MOD 30 MIN: CPT | Performed by: PHYSICIAN ASSISTANT

## 2021-06-16 PROCEDURE — 84484 ASSAY OF TROPONIN QUANT: CPT | Performed by: PHYSICIAN ASSISTANT

## 2021-06-16 PROCEDURE — 36415 COLL VENOUS BLD VENIPUNCTURE: CPT | Performed by: PHYSICIAN ASSISTANT

## 2021-06-16 RX ORDER — NITROGLYCERIN 0.4 MG/1
TABLET SUBLINGUAL
Qty: 25 TABLET | Refills: 0 | Status: SHIPPED | OUTPATIENT
Start: 2021-06-16 | End: 2023-02-01

## 2021-06-16 NOTE — PROGRESS NOTES
Criss Guidry is a 65 year old who presents for the following health issues     HPI     Chest Pain  Onset/Duration: 1 month  Description:   Location: centered, behind breast bone  Character: tight, can be tender to the touch  Radiation: stays in chest  Duration: worse with exertion can last until he stops doing whatever he is doing and intermittent   Intensity: depends on exertion  Progression of Symptoms: worsening  Accompanying Signs & Symptoms:  Shortness of breath: YES  Sweating: no  Nausea/vomiting: no  Lightheadedness: no  Palpitations: no  Fever/Chills: no  Cough: YES           Heartburn: no  History:   Family history of heart disease: grandparents passed from heart attacks  Tobacco use: no  Previous similar symptoms: no   Precipitating factors:   Worse with exertion: YES  Worse with deep breaths: no           Related to eating: no           Better with burping: no  Therapies tried and outcome: none    Noticed 6 wks ago. Exertional. Notes a tightness, then advances to an ache.   No obvious radicular symptoms. Some mild sob. Resting helps pain to resolve. No injury.   No dizziness or palpitations. No gerd symptoms.     Review of Systems   Constitutional, HEENT, cardiovascular, pulmonary, GI, , musculoskeletal, neuro, skin, endocrine and psych systems are negative, except as otherwise noted.      Objective    /82   Pulse 54   Temp 97.8  F (36.6  C) (Tympanic)   Resp 16   Wt 91.1 kg (200 lb 12.8 oz)   SpO2 95%   BMI 28.27 kg/m    Body mass index is 28.27 kg/m .  Physical Exam   Eye exam - right eye normal lid, conjunctiva, cornea, pupil and fundus, left eye normal lid, conjunctiva, cornea, pupil and fundus.  Thyroid not palpable, not enlarged, no nodules detected.  CHEST:chest clear to IPPA, no tachypnea, retractions or cyanosis and S1, S2 normal, no murmur, no gallop, rate regular.  The abdomen is soft without tenderness, guarding, mass, rebound or organomegaly. Bowel sounds are  normal. No CVA tenderness or inguinal adenopathy noted.    Chao was seen today for chest pain.    Diagnoses and all orders for this visit:    Chest pain, unspecified type  -     XR Chest 2 Views; Future  -     EKG 12-lead complete w/read - Clinics  -     Troponin I  -     nitroGLYcerin (NITROSTAT) 0.4 MG sublingual tablet; For chest pain place 1 tablet under the tongue every 5 minutes for 3 doses. If symptoms persist 5 minutes after 1st dose call 911.  -     CARDIOLOGY EVAL ADULT REFERRAL; Future  -     aspirin (ASA) 81 MG EC tablet; Take 1 tablet (81 mg) by mouth daily    Screening for hyperlipidemia  -     Lipid panel reflex to direct LDL Fasting    Other orders  -     REVIEW OF HEALTH MAINTENANCE PROTOCOL ORDERS      work on lifestyle modification  Minimal activity until he sees cardiology.  Contact EMS of symptoms persist or worsen.

## 2021-06-18 RX ORDER — ATORVASTATIN CALCIUM 20 MG/1
20 TABLET, FILM COATED ORAL DAILY
Qty: 90 TABLET | Refills: 3 | Status: ON HOLD | OUTPATIENT
Start: 2021-06-18 | End: 2021-07-23

## 2021-06-23 ENCOUNTER — TELEPHONE (OUTPATIENT)
Dept: FAMILY MEDICINE | Facility: CLINIC | Age: 66
End: 2021-06-23

## 2021-06-23 ENCOUNTER — DOCUMENTATION ONLY (OUTPATIENT)
Dept: CARDIOLOGY | Facility: CLINIC | Age: 66
End: 2021-06-23

## 2021-06-23 DIAGNOSIS — E78.5 HYPERLIPIDEMIA LDL GOAL <100: ICD-10-CM

## 2021-06-23 DIAGNOSIS — R07.89 ATYPICAL CHEST PAIN: Primary | ICD-10-CM

## 2021-06-23 NOTE — PROGRESS NOTES
Patient showed up in clinic today under the impression that he had cardiology imaging today or a visit with Dr. Bansal. He said Sony Combs told him to come into clinic today for imaging. No imaging has been ordered by Sony Combs, a referral for cardiology was placed and patient is scheduled to see Ro Bansal on 7/21/21. I informed patient that if his chest pain is getting worse he needs to seek care in the ED otherwise he should follow up with Sony Combs for a further plan. Patient frustrated that he could not walk in and be seen today. I apologized but asked patient to follow up with Sony Combs or seek care in the ED.     Vamsi Medeiros RN   [facility] Care Coordinator  [facility]   Phone: [phone]  Fax: [phone]

## 2021-06-23 NOTE — TELEPHONE ENCOUNTER
Patient called went to have his imaging done they told him he did not have an appointment until 07/21/21 he is wondering if this should be done sooner. Please call and advise.  Ara Bustos,

## 2021-06-25 RX ORDER — ATORVASTATIN CALCIUM 20 MG/1
20 TABLET, FILM COATED ORAL DAILY
Qty: 90 TABLET | Refills: 3 | Status: ON HOLD | OUTPATIENT
Start: 2021-06-25 | End: 2021-07-23

## 2021-06-25 NOTE — TELEPHONE ENCOUNTER
I called and spoke to patient, provided him with phone number TC advises to call to schedule stress echo with  cardiology:   [phone].    He states he will call.   Advised him of atorvastatin Rx sent, he is agreeable to start, is already on the daily baby aspirin.    Has not tried the ntg yet, will consider if he has chest pain again.    He says he is on a wait list to see cardiology sooner.    Haley Schroeder RN  [facility]

## 2021-06-25 NOTE — TELEPHONE ENCOUNTER
Let Chao know I want him to have a stress test. Help him to get this scheduled asap in [place]. Also, due to rising cholesterol numbers I am starting him on lipitor. The rest of his lab work, ekg and cxr came back looking fine. I am still concerned about his exertional chest pain and am surprised cardiology to schedule him sooner. Lets see what the stress test shows. Ask him if he's tried taking a nitroglycerin tab and if he has , has it helped. Have him take an aspirin daily as well.

## 2021-07-12 ENCOUNTER — ANCILLARY PROCEDURE (OUTPATIENT)
Dept: CARDIOLOGY | Facility: CLINIC | Age: 66
End: 2021-07-12
Attending: PHYSICIAN ASSISTANT
Payer: COMMERCIAL

## 2021-07-12 DIAGNOSIS — R07.89 ATYPICAL CHEST PAIN: ICD-10-CM

## 2021-07-12 PROCEDURE — 93017 CV STRESS TEST TRACING ONLY: CPT | Performed by: INTERNAL MEDICINE

## 2021-07-12 PROCEDURE — 93325 DOPPLER ECHO COLOR FLOW MAPG: CPT | Performed by: INTERNAL MEDICINE

## 2021-07-12 PROCEDURE — 93350 STRESS TTE ONLY: CPT | Performed by: INTERNAL MEDICINE

## 2021-07-12 PROCEDURE — 93016 CV STRESS TEST SUPVJ ONLY: CPT | Performed by: INTERNAL MEDICINE

## 2021-07-12 PROCEDURE — 93321 DOPPLER ECHO F-UP/LMTD STD: CPT | Performed by: INTERNAL MEDICINE

## 2021-07-12 PROCEDURE — 93352 ADMIN ECG CONTRAST AGENT: CPT | Performed by: INTERNAL MEDICINE

## 2021-07-12 PROCEDURE — 93018 CV STRESS TEST I&R ONLY: CPT | Performed by: INTERNAL MEDICINE

## 2021-07-12 RX ADMIN — Medication 5 ML: at 14:14

## 2021-07-20 NOTE — PROGRESS NOTES
Chief Complaint: Chest pain     HPI: Chao Quezada is a 65 year old male with a past medical history of dyslpidemia who was referred to me for evaluation of chest pain by Mr. Sony Combs. He came to the appointment with his wife today.     At baseline, Mr. Quezada is a .  His work involves walking up dozens of flights of stairs a day and spending most of the day on his feet.  He notes that he was able to perform this work in an unrestricted fashion until approximately 3 months ago.  At this time, he noted that he had onset of central chest tightness when he was walking.  The chest tightness has continued to recur with activity and, in fact, become more frequent, further limiting his activity tolerance.  He notes that in the last month he has only been able to walk one flight of stairs before he has the onset of the chest tightness.  He describes this as being central but, more recently, also radiating to his left arm.  He has been able to relieve self of the chest tightness by resting.  Briefly, Mr. Quezada saw Julieta Lauryn for evaluation of this chest tightness last month.  At the time, he was given as needed nitroglycerin tablets, started on atorvastatin, and referred for an exercise stress echo.  Mr. Quezada only achieved 69% of his target heart rate but he developed wall motion abnormalities in the LAD territory.  At the stage, he was referred to cardiology for further evaluation.  Of note, Mr. Quezada notes that he has had some relief with the nitroglycerin tablets, but he does sometimes have headache with them.    At the time of the consultation, he notes an absence of chest pain at rest, dyspnea at rest or with exertion, PND, orthopnea, peripheral edema, palpitations, hemoptysis, fever, cough, lightheadedness or syncope.     A comprehensive ROS was done and the details are included above in the HPI.    Past Medical History:  - Dyslipidemia, started on statin in 06/2021   - No prior history of  hypertension, T2DM, CAD, TIA/stroke, vascular aneurysms, PAD  Past Medical History:   Diagnosis Date     Backache, unspecified      Dermatophytosis of nail 1998     Headache        Past Surgical History:    Past Surgical History:   Procedure Laterality Date     C ANESTH,KNEE JOINT; NOS  1973    right knee, with hx of dislocaton of knee cap     COLONOSCOPY WITH CO2 INSUFFLATION N/A 11/30/2016    Procedure: COLONOSCOPY WITH CO2 INSUFFLATION;  Surgeon: Duane, William Charles, MD;  Location: [facility] OR     HC EXPLORATION ANKLE JOINT  1994    right ankle fracture with pins and screws     ZZHC COLONOSCOPY W/WO BRUSH/WASH  1/24/2005        Drug History:  Home cardiac meds: Aspirin 81 mg once daily, atorvastatin 20 mg once daily.  Current Outpatient Medications   Medication Sig Dispense Refill     aspirin (ASA) 81 MG EC tablet Take 1 tablet (81 mg) by mouth daily 90 tablet 1     atorvastatin (LIPITOR) 20 MG tablet Take 1 tablet (20 mg) by mouth daily 90 tablet 3     butalbital-acetaminophen-caffeine (ESGIC) -40 MG tablet Take 1 tablet by mouth every 4 hours as needed 28 tablet 1     nitroGLYcerin (NITROSTAT) 0.4 MG sublingual tablet For chest pain place 1 tablet under the tongue every 5 minutes for 3 doses. If symptoms persist 5 minutes after 1st dose call 911. 25 tablet 0     terbinafine (LAMISIL) 250 MG tablet Take 1 tablet (250 mg) by mouth daily 90 tablet 0     atorvastatin (LIPITOR) 20 MG tablet Take 1 tablet (20 mg) by mouth daily 90 tablet 3         Family History:  - No family history of sudden cardiac death, premature CAD, valvular disorders  Family History   Problem Relation Age of Onset     Cancer Father         throat, prostate     Prostate Cancer Father      Diabetes Mother      Cancer Brother        Social History:    Social History     Tobacco Use     Smoking status: Never Smoker     Smokeless tobacco: Never Used     Tobacco comment: never smoked   Substance Use Topics     Alcohol use:  "Yes     Comment: rare       Allergies   Allergen Reactions     No Known Drug Allergies          Physical Examination:  Vitals: BP (!) 140/86 (BP Location: Left arm, Patient Position: Sitting, Cuff Size: Adult Large)   Pulse 56   Ht 1.803 m (5' 11\")   Wt 92 kg (202 lb 14.4 oz)   SpO2 97%   BMI 28.30 kg/m    BMI= Body mass index is 28.3 kg/m .    GENERAL: Healthy, alert and no distress  Eyes: No xanthelasmas.  NECK: No palpable neck masses/goitre and no evidence of retrosternal goitre.   ENT: Moist mucosal membranes.  RESPIRATORY: No signs of resp distress. Lungs CTAB.  CARDIOVASCULAR:  No JVD, regular, normal S1+S2 without added sounds or murmurs.  ABDOMEN: ND, soft, non-tender, normal bowel sounds.  EXTREMITIES: Warm, well-perfused, no edema.   NEUROLOGY: GCS 15/15, no focal deficits.  VASC: No carotid bruits. Carotid, radial, brachial, popliteal, dorsalis pedis and posterior tibialis pulses are normal in volumes and symmetric bilaterally.   SKIN: No ecchymoses, no rashes.  PSYCH: Cooperative, pleasant affect.       Investigations:  I personally viewed and interpreted the following investigations:    Labs:    CMP RESULTS:  Lab Results   Component Value Date     01/23/2018    POTASSIUM 4.1 01/23/2018    CHLORIDE 105 01/23/2018    CO2 26 01/23/2018    ANIONGAP 9 01/23/2018    GLC 74 01/23/2018    BUN 14 01/23/2018    CR 0.97 01/23/2018    GFRESTIMATED 78 01/23/2018    GFRESTBLACK >90 01/23/2018    NICKY 9.0 01/23/2018    BILITOTAL 0.5 10/27/2016    ALBUMIN 4.1 10/27/2016    ALKPHOS 65 10/27/2016    ALT 23 10/27/2016    AST 19 10/27/2016          LIPIDS:  Lab Results   Component Value Date    CHOL 203 06/16/2021     Lab Results   Component Value Date    HDL 52 06/16/2021     Lab Results   Component Value Date     06/16/2021     Lab Results   Component Value Date    TRIG 49 06/16/2021     Lab Results   Component Value Date    CHOLHDLRATIO 3.9 09/04/2015       Recent Tests:    Electrocardiogram " (07/16/2021):  Sinus bradycardia, nl axis/intervals, no ischemic changes.     Echocardiogram (07/12/2021):     Abnormal exercise stress echocardiogram.  Test terminated at 69% MPHR due to chest pain and wall motion abnormality.  Normal resting images with EF 55-60%. No resting wall motion abnormalities.  With stress no increase in LV EF,patient had chest pain and LAD territory  akinesis.  No EKG evidence for ischemia.  Blunted BP response to exercise.  Normal functional capacity.  Normal baseline screening echocardiogram with no significant valvular  abnormalities. Mild mascending aortc dilation.      Assessment and Plan:   Chao Quezada is a 65 year old male with a past medical history of dyslipidemia who presented to me for evaluation of unstable angina.    Mr. Quezada's history is classical for unstable angina.  He notes that the frequency has been increasing and that the distance that he can walk without angina is getting shorter.  In addition, he has a high-risk pattern of wall motion abnormalities on his exercise stress echocardiogram, even though he did not reach his target heart rate.  In view of this, I discussed possible the investigative strategies with him.  Given that his exercise stress echocardiogram was reflective of these wall motion abnormalities, I discussed the performance of invasive coronary angiogram with him in detail.  We discussed the intended benefits, risks, and alternatives.  After discussion of the risks (including bleeding complications, periprocedural myocardial infarction, stroke, and death, and the potential need for coronary bypass grafting), Mr. Quezada and his wife are in agreement with continuing with invasive coronary angiography. We will aim to schedule him in the next 1-2 weeks.     In an effort to mitigate his symptoms in the interim, I will start him on Imdur 30 mg once daily.  I have chosen this rather than a beta-blocker given that his heart rate was already 55 in clinic today  and he states that he is often in the 40s at rest.  I told Mr. Quezada to send me a message via [facility] if the headache is unbearable.  In this case we will consider alternate antianginals, including ranolazine.  He is already on aspirin and a moderate intensity statin.  I will await the results of the angiogram before deciding how to titrate the statin, but I told him that it will almost inevitably be increased.    Problems:  1. Unstable angina  2. Dyslipidemia  3. Aorta of 4.1 cm on TTE (indexed value of 1.9 cm/m2)  Plan:  - Start Imdur 30 mg q24h  - Continue ASA 81 mg q24h  - Coronary angiogram in the next 1-2 weeks   - Continue atorvastatin 20 mg q24h until angiogram results are available to us  - No further aortic surveillance needed given that the ascending aorta is normal in caliber when indexed to BSA  - To go to ER if pain is not relieved by NTG tablets      Chart review time: 25  Visit time: 35  Total time spent: 60  >50% of this 60-minute visit were spent with the patient on in-person counseling and discussion regarding unstable angina.     Angel Bansal [facility], [place]    Cardiovascular Division  Pager [phone]    CC  Patient Care Team:  Sony Combs PA-C as PCP - General (Physician Assistant)  Sony Combs PA-C as Assigned PCP

## 2021-07-21 ENCOUNTER — OFFICE VISIT (OUTPATIENT)
Dept: CARDIOLOGY | Facility: CLINIC | Age: 66
End: 2021-07-21
Payer: COMMERCIAL

## 2021-07-21 VITALS
OXYGEN SATURATION: 97 % | SYSTOLIC BLOOD PRESSURE: 140 MMHG | HEIGHT: 71 IN | WEIGHT: 202.9 LBS | HEART RATE: 56 BPM | DIASTOLIC BLOOD PRESSURE: 86 MMHG | BODY MASS INDEX: 28.4 KG/M2

## 2021-07-21 DIAGNOSIS — R07.9 CHEST PAIN, UNSPECIFIED TYPE: ICD-10-CM

## 2021-07-21 DIAGNOSIS — I25.110 CORONARY ARTERY DISEASE INVOLVING NATIVE CORONARY ARTERY OF NATIVE HEART WITH UNSTABLE ANGINA PECTORIS (H): Primary | ICD-10-CM

## 2021-07-21 LAB — SARS-COV-2 RNA RESP QL NAA+PROBE: NEGATIVE

## 2021-07-21 PROCEDURE — U0003 INFECTIOUS AGENT DETECTION BY NUCLEIC ACID (DNA OR RNA); SEVERE ACUTE RESPIRATORY SYNDROME CORONAVIRUS 2 (SARS-COV-2) (CORONAVIRUS DISEASE [COVID-19]), AMPLIFIED PROBE TECHNIQUE, MAKING USE OF HIGH THROUGHPUT TECHNOLOGIES AS DESCRIBED BY CMS-2020-01-R: HCPCS | Performed by: STUDENT IN AN ORGANIZED HEALTH CARE EDUCATION/TRAINING PROGRAM

## 2021-07-21 PROCEDURE — U0005 INFEC AGEN DETEC AMPLI PROBE: HCPCS | Performed by: STUDENT IN AN ORGANIZED HEALTH CARE EDUCATION/TRAINING PROGRAM

## 2021-07-21 PROCEDURE — 99205 OFFICE O/P NEW HI 60 MIN: CPT | Performed by: STUDENT IN AN ORGANIZED HEALTH CARE EDUCATION/TRAINING PROGRAM

## 2021-07-21 RX ORDER — LIDOCAINE 40 MG/G
CREAM TOPICAL
Status: CANCELLED | OUTPATIENT
Start: 2021-07-21

## 2021-07-21 RX ORDER — ISOSORBIDE MONONITRATE 30 MG/1
30 TABLET, EXTENDED RELEASE ORAL DAILY
Qty: 90 TABLET | Refills: 3 | Status: SHIPPED | OUTPATIENT
Start: 2021-07-21 | End: 2021-08-04

## 2021-07-21 RX ORDER — ASPIRIN 81 MG/1
325 TABLET ORAL DAILY
Status: CANCELLED | OUTPATIENT
Start: 2021-07-21 | End: 2021-07-23

## 2021-07-21 RX ORDER — SODIUM CHLORIDE 9 MG/ML
INJECTION, SOLUTION INTRAVENOUS CONTINUOUS
Status: CANCELLED | OUTPATIENT
Start: 2021-07-21

## 2021-07-21 ASSESSMENT — MIFFLIN-ST. JEOR: SCORE: 1722.48

## 2021-07-21 NOTE — PATIENT INSTRUCTIONS
You are scheduled for a Coronary Angiogram on Friday  at the [facility], [address] [place] [address], [place] [address]. You are to check in at the [facility] Waiting Area at 10:30am.        When you arrive you will be escorted back to the pre procedure area called 2A. Here they will insert an IV, draw labs, and obtain a short medical history. Please bring an updated list of your current medications.     A physician will come and talk with you about the procedure and obtain consent.     A nurse from the Cardiac Catheterization Lab will then escort you to the procedure area. You will be receiving sedation during the procedure so you will need someone to drive you to and from the hospital.     After the procedure you will recover for a short period (2 - 6 hours) on 3C. You will be discharged with instructions for post procedure care. However, depending on what the angiogram shows you may have to have stents placed and this might require an overnight stay. We ask that you bring a small bag of necessities for your comfort if you would need to stay overnight. DO NOT BRING ANY VALUABLES.        Pre procedure instructions:  1. Make arrangements to have a  as you will not be allowed to drive following the procedure. Someone should stay with you the night after your procedure.    2.  Do not eat any solid food or milk products for 8 hours prior to the exam. You may drink clear liquids until 2 hours prior to the exam. Clear liquids include the following: water, Jell-O, clear broth, apple juice or any non-carbonated drink that you can see through (no pop!).     3. Do not drink alcohol or smoke 24 hours prior to test.    4. You may take your other morning medications as prescribed with a sip of water. If you currently take an aspirin, continue taking your same dose.              The following is a summary of your office visit today:    Start Imdur 30 mg daily      Nurse contact information:   Cardiology Care Coordinators:  Amira Rice RN and Vamsi Medeiros, RN    [phone] Phone      [phone] Fax      HOW TO CHECK YOUR BLOOD PRESSURE AT HOME:     Avoid eating, smoking, and exercising for at least 30 minutes before taking a reading.    Be sure you have taken your BP medication at least 2-3 hours before you check it.     Sit quietly for 10 minutes before a reading.     Sit in a chair with your feet flat on the floor. Rest your  arm on a table so that the arm cuff is at the same level as your heart.    Remain still during the reading.    Record your blood pressure and pulse in a log and bring to your next appointment.     If you have had any blood work, imaging or other testing completed we will be in touch within 1-2 weeks regarding the results. If you have any questions, concerns or need to schedule a follow up, please contact us at [phone]. If you are needing refills please contact your pharmacy. For urgent after hour care please call the [facility] at [phone] or the [facility] at [phone] and ask to speak to the cardiologist on call.    It was a pleasure meeting with you today. Please let us know if there is anything else we can do for you so that we can be sure you are leaving completely satisfied with your care experience.     Your Cardiology Team at [facility]  RN Care Coordinators: Marni  CMA: Guerda

## 2021-07-21 NOTE — PROGRESS NOTES
"Chao Quezada's goals for this visit include: Discover cause of chest pain    He requests these members of his care team be copied on today's visit information: PCP    PCP: Sony Combs    Referring Provider:  Sony Combs PA-C  [id number] [place] [address]    BP (!) 140/86 (BP Location: Left arm, Patient Position: Sitting, Cuff Size: Adult Large)   Pulse 56   Ht 1.803 m (5' 11\")   Wt 92 kg (202 lb 14.4 oz)   SpO2 97%   BMI 28.30 kg/m      Do you need any medication refills at today's visit? No    Jairo Valdez, EMT  Clinic Support  [facility]    [phone]    Employed by [facility]        "

## 2021-07-22 ENCOUNTER — TELEPHONE (OUTPATIENT)
Dept: CARDIOLOGY | Facility: CLINIC | Age: 66
End: 2021-07-22

## 2021-07-22 NOTE — TELEPHONE ENCOUNTER
Left voicemail for patient to complete Travel Screen for Cardiac Cath Lab appointment on 7/23/21 and inform patient of updated Visitor Policy.  COVID negative.  Tracy Ramey RN

## 2021-07-23 ENCOUNTER — HOSPITAL ENCOUNTER (OUTPATIENT)
Facility: CLINIC | Age: 66
Discharge: HOME | End: 2021-07-23
Attending: INTERNAL MEDICINE | Admitting: INTERNAL MEDICINE
Payer: COMMERCIAL

## 2021-07-23 ENCOUNTER — APPOINTMENT (OUTPATIENT)
Dept: LAB | Facility: CLINIC | Age: 66
End: 2021-07-23
Attending: INTERNAL MEDICINE
Payer: COMMERCIAL

## 2021-07-23 ENCOUNTER — APPOINTMENT (OUTPATIENT)
Dept: MEDSURG UNIT | Facility: CLINIC | Age: 66
End: 2021-07-23
Attending: INTERNAL MEDICINE
Payer: COMMERCIAL

## 2021-07-23 VITALS
OXYGEN SATURATION: 97 % | WEIGHT: 202 LBS | BODY MASS INDEX: 28.28 KG/M2 | HEART RATE: 56 BPM | TEMPERATURE: 97.9 F | HEIGHT: 71 IN | DIASTOLIC BLOOD PRESSURE: 97 MMHG | SYSTOLIC BLOOD PRESSURE: 165 MMHG | RESPIRATION RATE: 18 BRPM

## 2021-07-23 DIAGNOSIS — Z98.890 STATUS POST CORONARY ANGIOGRAM: Primary | ICD-10-CM

## 2021-07-23 DIAGNOSIS — I25.110 CORONARY ARTERY DISEASE INVOLVING NATIVE CORONARY ARTERY OF NATIVE HEART WITH UNSTABLE ANGINA PECTORIS (H): ICD-10-CM

## 2021-07-23 DIAGNOSIS — R07.9 CHEST PAIN, UNSPECIFIED TYPE: ICD-10-CM

## 2021-07-23 DIAGNOSIS — E78.5 HYPERLIPIDEMIA LDL GOAL <100: ICD-10-CM

## 2021-07-23 LAB
ACT BLD: 227 SECONDS (ref 74–150)
ACT BLD: 247 SECONDS (ref 74–150)
ANION GAP SERPL CALCULATED.3IONS-SCNC: 5 MMOL/L (ref 3–14)
BUN SERPL-MCNC: 19 MG/DL (ref 7–30)
CALCIUM SERPL-MCNC: 8.8 MG/DL (ref 8.5–10.1)
CHLORIDE BLD-SCNC: 108 MMOL/L (ref 94–109)
CO2 SERPL-SCNC: 27 MMOL/L (ref 20–32)
CREAT SERPL-MCNC: 1.02 MG/DL (ref 0.66–1.25)
ERYTHROCYTE [DISTWIDTH] IN BLOOD BY AUTOMATED COUNT: 12.6 % (ref 10–15)
GFR SERPL CREATININE-BSD FRML MDRD: 76 ML/MIN/1.73M2
GLUCOSE BLD-MCNC: 85 MG/DL (ref 70–99)
HCT VFR BLD AUTO: 45 % (ref 40–53)
HGB BLD-MCNC: 14.8 G/DL (ref 13.3–17.7)
MCH RBC QN AUTO: 29.8 PG (ref 26.5–33)
MCHC RBC AUTO-ENTMCNC: 32.9 G/DL (ref 31.5–36.5)
MCV RBC AUTO: 91 FL (ref 78–100)
PLATELET # BLD AUTO: 275 10E3/UL (ref 150–450)
POTASSIUM BLD-SCNC: 4.2 MMOL/L (ref 3.4–5.3)
RBC # BLD AUTO: 4.97 10E6/UL (ref 4.4–5.9)
SODIUM SERPL-SCNC: 140 MMOL/L (ref 133–144)
WBC # BLD AUTO: 7 10E3/UL (ref 4–11)

## 2021-07-23 PROCEDURE — 93010 ELECTROCARDIOGRAM REPORT: CPT | Mod: 76 | Performed by: INTERNAL MEDICINE

## 2021-07-23 PROCEDURE — 250N000013 HC RX MED GY IP 250 OP 250 PS 637: Performed by: STUDENT IN AN ORGANIZED HEALTH CARE EDUCATION/TRAINING PROGRAM

## 2021-07-23 PROCEDURE — 272N000001 HC OR GENERAL SUPPLY STERILE: Performed by: INTERNAL MEDICINE

## 2021-07-23 PROCEDURE — 250N000011 HC RX IP 250 OP 636: Performed by: INTERNAL MEDICINE

## 2021-07-23 PROCEDURE — 93454 CORONARY ARTERY ANGIO S&I: CPT | Performed by: INTERNAL MEDICINE

## 2021-07-23 PROCEDURE — 999N000054 HC STATISTIC EKG NON-CHARGEABLE

## 2021-07-23 PROCEDURE — 250N000009 HC RX 250: Performed by: INTERNAL MEDICINE

## 2021-07-23 PROCEDURE — C1769 GUIDE WIRE: HCPCS | Performed by: INTERNAL MEDICINE

## 2021-07-23 PROCEDURE — 93005 ELECTROCARDIOGRAM TRACING: CPT

## 2021-07-23 PROCEDURE — C1725 CATH, TRANSLUMIN NON-LASER: HCPCS | Performed by: INTERNAL MEDICINE

## 2021-07-23 PROCEDURE — 82565 ASSAY OF CREATININE: CPT | Performed by: STUDENT IN AN ORGANIZED HEALTH CARE EDUCATION/TRAINING PROGRAM

## 2021-07-23 PROCEDURE — 99153 MOD SED SAME PHYS/QHP EA: CPT | Performed by: INTERNAL MEDICINE

## 2021-07-23 PROCEDURE — 85027 COMPLETE CBC AUTOMATED: CPT | Performed by: STUDENT IN AN ORGANIZED HEALTH CARE EDUCATION/TRAINING PROGRAM

## 2021-07-23 PROCEDURE — 85347 COAGULATION TIME ACTIVATED: CPT

## 2021-07-23 PROCEDURE — C1894 INTRO/SHEATH, NON-LASER: HCPCS | Performed by: INTERNAL MEDICINE

## 2021-07-23 PROCEDURE — C9600 PERC DRUG-EL COR STENT SING: HCPCS | Mod: LD | Performed by: INTERNAL MEDICINE

## 2021-07-23 PROCEDURE — 92921 HC PRQ TRLUML CORONARY ANGIOPLASTY ADDL BRANCH: CPT | Performed by: INTERNAL MEDICINE

## 2021-07-23 PROCEDURE — 250N000013 HC RX MED GY IP 250 OP 250 PS 637: Performed by: INTERNAL MEDICINE

## 2021-07-23 PROCEDURE — C1887 CATHETER, GUIDING: HCPCS | Performed by: INTERNAL MEDICINE

## 2021-07-23 PROCEDURE — 272N000002 HC OR SUPPLY OTHER OPNP: Performed by: INTERNAL MEDICINE

## 2021-07-23 PROCEDURE — 99152 MOD SED SAME PHYS/QHP 5/>YRS: CPT | Performed by: INTERNAL MEDICINE

## 2021-07-23 PROCEDURE — 82374 ASSAY BLOOD CARBON DIOXIDE: CPT | Performed by: STUDENT IN AN ORGANIZED HEALTH CARE EDUCATION/TRAINING PROGRAM

## 2021-07-23 PROCEDURE — 36415 COLL VENOUS BLD VENIPUNCTURE: CPT | Performed by: STUDENT IN AN ORGANIZED HEALTH CARE EDUCATION/TRAINING PROGRAM

## 2021-07-23 PROCEDURE — 999N000142 HC STATISTIC PROCEDURE PREP ONLY

## 2021-07-23 PROCEDURE — C1874 STENT, COATED/COV W/DEL SYS: HCPCS | Performed by: INTERNAL MEDICINE

## 2021-07-23 DEVICE — STENT SYNERGY DRUG ELUTING 3.00X32MM  H7493926032300: Type: IMPLANTABLE DEVICE | Status: FUNCTIONAL

## 2021-07-23 DEVICE — STENT SYNERGY DRUG ELUTING 3.50X24MM  H7493926024350: Type: IMPLANTABLE DEVICE | Status: FUNCTIONAL

## 2021-07-23 DEVICE — STENT SYNERGY DRUG ELUTING 3.50X32MM  H7493926032350: Type: IMPLANTABLE DEVICE | Status: FUNCTIONAL

## 2021-07-23 DEVICE — STENT SYNERGY DRUG ELUTING 2.50X38MM  H7493926038250: Type: IMPLANTABLE DEVICE | Status: FUNCTIONAL

## 2021-07-23 RX ORDER — IOPAMIDOL 755 MG/ML
INJECTION, SOLUTION INTRAVASCULAR
Status: DISCONTINUED | OUTPATIENT
Start: 2021-07-23 | End: 2021-07-23 | Stop reason: HOSPADM

## 2021-07-23 RX ORDER — METOPROLOL TARTRATE 1 MG/ML
5-10 INJECTION, SOLUTION INTRAVENOUS
Status: DISCONTINUED | OUTPATIENT
Start: 2021-07-23 | End: 2021-07-23

## 2021-07-23 RX ORDER — HEPARIN SODIUM 1000 [USP'U]/ML
INJECTION, SOLUTION INTRAVENOUS; SUBCUTANEOUS
Status: DISCONTINUED | OUTPATIENT
Start: 2021-07-23 | End: 2021-07-23 | Stop reason: HOSPADM

## 2021-07-23 RX ORDER — ONDANSETRON 4 MG/1
4 TABLET, ORALLY DISINTEGRATING ORAL EVERY 6 HOURS PRN
Status: DISCONTINUED | OUTPATIENT
Start: 2021-07-23 | End: 2021-07-23

## 2021-07-23 RX ORDER — ONDANSETRON 2 MG/ML
4 INJECTION INTRAMUSCULAR; INTRAVENOUS EVERY 6 HOURS PRN
Status: DISCONTINUED | OUTPATIENT
Start: 2021-07-23 | End: 2021-07-23 | Stop reason: HOSPADM

## 2021-07-23 RX ORDER — FLUMAZENIL 0.1 MG/ML
0.2 INJECTION, SOLUTION INTRAVENOUS
Status: DISCONTINUED | OUTPATIENT
Start: 2021-07-23 | End: 2021-07-23 | Stop reason: HOSPADM

## 2021-07-23 RX ORDER — NALOXONE HYDROCHLORIDE 0.4 MG/ML
0.2 INJECTION, SOLUTION INTRAMUSCULAR; INTRAVENOUS; SUBCUTANEOUS
Status: DISCONTINUED | OUTPATIENT
Start: 2021-07-23 | End: 2021-07-23

## 2021-07-23 RX ORDER — HYDRALAZINE HYDROCHLORIDE 20 MG/ML
10 INJECTION INTRAMUSCULAR; INTRAVENOUS EVERY 4 HOURS PRN
Status: DISCONTINUED | OUTPATIENT
Start: 2021-07-23 | End: 2021-07-23

## 2021-07-23 RX ORDER — NALOXONE HYDROCHLORIDE 0.4 MG/ML
0.2 INJECTION, SOLUTION INTRAMUSCULAR; INTRAVENOUS; SUBCUTANEOUS
Status: DISCONTINUED | OUTPATIENT
Start: 2021-07-23 | End: 2021-07-23 | Stop reason: HOSPADM

## 2021-07-23 RX ORDER — NICARDIPINE HYDROCHLORIDE 2.5 MG/ML
INJECTION INTRAVENOUS
Status: DISCONTINUED | OUTPATIENT
Start: 2021-07-23 | End: 2021-07-23 | Stop reason: HOSPADM

## 2021-07-23 RX ORDER — NITROGLYCERIN 5 MG/ML
VIAL (ML) INTRAVENOUS
Status: DISCONTINUED | OUTPATIENT
Start: 2021-07-23 | End: 2021-07-23 | Stop reason: HOSPADM

## 2021-07-23 RX ORDER — ONDANSETRON 4 MG/1
4 TABLET, ORALLY DISINTEGRATING ORAL EVERY 6 HOURS PRN
Status: DISCONTINUED | OUTPATIENT
Start: 2021-07-23 | End: 2021-07-23 | Stop reason: HOSPADM

## 2021-07-23 RX ORDER — ATROPINE SULFATE 0.1 MG/ML
0.5 INJECTION INTRAVENOUS
Status: DISCONTINUED | OUTPATIENT
Start: 2021-07-23 | End: 2021-07-23

## 2021-07-23 RX ORDER — FLUMAZENIL 0.1 MG/ML
0.2 INJECTION, SOLUTION INTRAVENOUS
Status: DISCONTINUED | OUTPATIENT
Start: 2021-07-23 | End: 2021-07-23

## 2021-07-23 RX ORDER — NALOXONE HYDROCHLORIDE 0.4 MG/ML
0.4 INJECTION, SOLUTION INTRAMUSCULAR; INTRAVENOUS; SUBCUTANEOUS
Status: DISCONTINUED | OUTPATIENT
Start: 2021-07-23 | End: 2021-07-23 | Stop reason: HOSPADM

## 2021-07-23 RX ORDER — NALOXONE HYDROCHLORIDE 0.4 MG/ML
0.4 INJECTION, SOLUTION INTRAMUSCULAR; INTRAVENOUS; SUBCUTANEOUS
Status: DISCONTINUED | OUTPATIENT
Start: 2021-07-23 | End: 2021-07-23

## 2021-07-23 RX ORDER — FENTANYL CITRATE 50 UG/ML
25 INJECTION, SOLUTION INTRAMUSCULAR; INTRAVENOUS
Status: DISCONTINUED | OUTPATIENT
Start: 2021-07-23 | End: 2021-07-23 | Stop reason: HOSPADM

## 2021-07-23 RX ORDER — ASPIRIN 81 MG/1
81 TABLET, CHEWABLE ORAL ONCE
Status: DISCONTINUED | OUTPATIENT
Start: 2021-07-23 | End: 2021-07-23

## 2021-07-23 RX ORDER — NITROGLYCERIN 0.4 MG/1
0.4 TABLET SUBLINGUAL EVERY 5 MIN PRN
Status: DISCONTINUED | OUTPATIENT
Start: 2021-07-23 | End: 2021-07-23 | Stop reason: HOSPADM

## 2021-07-23 RX ORDER — FENTANYL CITRATE 50 UG/ML
25 INJECTION, SOLUTION INTRAMUSCULAR; INTRAVENOUS
Status: DISCONTINUED | OUTPATIENT
Start: 2021-07-23 | End: 2021-07-23

## 2021-07-23 RX ORDER — FENTANYL CITRATE 50 UG/ML
INJECTION, SOLUTION INTRAMUSCULAR; INTRAVENOUS
Status: DISCONTINUED | OUTPATIENT
Start: 2021-07-23 | End: 2021-07-23 | Stop reason: HOSPADM

## 2021-07-23 RX ORDER — HYDRALAZINE HYDROCHLORIDE 20 MG/ML
10 INJECTION INTRAMUSCULAR; INTRAVENOUS EVERY 4 HOURS PRN
Status: DISCONTINUED | OUTPATIENT
Start: 2021-07-23 | End: 2021-07-23 | Stop reason: HOSPADM

## 2021-07-23 RX ORDER — NITROGLYCERIN 0.4 MG/1
0.4 TABLET SUBLINGUAL EVERY 5 MIN PRN
Status: DISCONTINUED | OUTPATIENT
Start: 2021-07-23 | End: 2021-07-23

## 2021-07-23 RX ORDER — ONDANSETRON 2 MG/ML
4 INJECTION INTRAMUSCULAR; INTRAVENOUS EVERY 6 HOURS PRN
Status: DISCONTINUED | OUTPATIENT
Start: 2021-07-23 | End: 2021-07-23

## 2021-07-23 RX ORDER — ATROPINE SULFATE 0.1 MG/ML
0.5 INJECTION INTRAVENOUS
Status: DISCONTINUED | OUTPATIENT
Start: 2021-07-23 | End: 2021-07-23 | Stop reason: HOSPADM

## 2021-07-23 RX ORDER — OXYCODONE HYDROCHLORIDE 5 MG/1
5 TABLET ORAL EVERY 4 HOURS PRN
Status: DISCONTINUED | OUTPATIENT
Start: 2021-07-23 | End: 2021-07-23

## 2021-07-23 RX ORDER — ATORVASTATIN CALCIUM 80 MG/1
80 TABLET, FILM COATED ORAL DAILY
Qty: 90 TABLET | Refills: 3 | Status: SHIPPED | OUTPATIENT
Start: 2021-07-23 | End: 2021-11-06

## 2021-07-23 RX ORDER — LIDOCAINE 40 MG/G
CREAM TOPICAL
Status: DISCONTINUED | OUTPATIENT
Start: 2021-07-23 | End: 2021-07-23 | Stop reason: HOSPADM

## 2021-07-23 RX ORDER — ASPIRIN 81 MG/1
81 TABLET ORAL DAILY
Status: DISCONTINUED | OUTPATIENT
Start: 2021-07-24 | End: 2021-07-23 | Stop reason: HOSPADM

## 2021-07-23 RX ORDER — OXYCODONE HYDROCHLORIDE 10 MG/1
10 TABLET ORAL EVERY 4 HOURS PRN
Status: DISCONTINUED | OUTPATIENT
Start: 2021-07-23 | End: 2021-07-23 | Stop reason: HOSPADM

## 2021-07-23 RX ORDER — OXYCODONE HYDROCHLORIDE 10 MG/1
10 TABLET ORAL EVERY 4 HOURS PRN
Status: DISCONTINUED | OUTPATIENT
Start: 2021-07-23 | End: 2021-07-23

## 2021-07-23 RX ORDER — ATORVASTATIN CALCIUM 80 MG/1
80 TABLET, FILM COATED ORAL DAILY
Status: DISCONTINUED | OUTPATIENT
Start: 2021-07-23 | End: 2021-07-23 | Stop reason: HOSPADM

## 2021-07-23 RX ORDER — OXYCODONE HYDROCHLORIDE 5 MG/1
5 TABLET ORAL EVERY 4 HOURS PRN
Status: DISCONTINUED | OUTPATIENT
Start: 2021-07-23 | End: 2021-07-23 | Stop reason: HOSPADM

## 2021-07-23 RX ORDER — ASPIRIN 81 MG/1
81 TABLET ORAL DAILY
Status: DISCONTINUED | OUTPATIENT
Start: 2021-07-24 | End: 2021-07-23

## 2021-07-23 RX ORDER — METOPROLOL TARTRATE 1 MG/ML
5-10 INJECTION, SOLUTION INTRAVENOUS
Status: DISCONTINUED | OUTPATIENT
Start: 2021-07-23 | End: 2021-07-23 | Stop reason: HOSPADM

## 2021-07-23 RX ORDER — ASPIRIN 81 MG/1
81 TABLET, CHEWABLE ORAL ONCE
Status: DISCONTINUED | OUTPATIENT
Start: 2021-07-23 | End: 2021-07-23 | Stop reason: HOSPADM

## 2021-07-23 RX ORDER — SODIUM CHLORIDE 9 MG/ML
INJECTION, SOLUTION INTRAVENOUS CONTINUOUS
Status: DISCONTINUED | OUTPATIENT
Start: 2021-07-23 | End: 2021-07-23

## 2021-07-23 RX ORDER — SODIUM CHLORIDE 9 MG/ML
INJECTION, SOLUTION INTRAVENOUS CONTINUOUS
Status: DISCONTINUED | OUTPATIENT
Start: 2021-07-23 | End: 2021-07-23 | Stop reason: HOSPADM

## 2021-07-23 RX ADMIN — ASPIRIN 325 MG: 325 TABLET, COATED ORAL at 11:08

## 2021-07-23 ASSESSMENT — MIFFLIN-ST. JEOR: SCORE: 1718.4

## 2021-07-23 NOTE — PLAN OF CARE
D/I/A: Pt roomed on 3C in [place] [facility].  Arrived via litter and accompanied by CCL RN. On monitor upon arrival. VSSA.  Sinus rhythm on monitor.  Denies pain or sob. Reviewed activity restrictions and when to notify RN, ie-changes to breathing or increased chest pressure or chest pain.  CCL access: TR Band on left radial. Site WNL.     P: Continue to monitor status.  Discharge to home once meeting criteria.

## 2021-07-23 NOTE — Clinical Note
Stent deployed in the proximal left anterior descending. Max pressure = 18 naomi. Total duration = 8 seconds.

## 2021-07-23 NOTE — DISCHARGE INSTRUCTIONS
Going Home after an Angioplasty or Stent Placement (Cardiac)  ______________________________________________      After you go home:    Have an adult stay with you for 24 hours.    Drink plenty of fluids.    You may eat your normal diet, unless your doctor tells you otherwise.    For 24 hours:    Relax and take it easy.    Do NOT smoke.    Do NOT make any important or legal decisions.    Do NOT drive or operate machines at home or at work.    Do NOT drink alcohol.    Remove the Band-Aid after 24 hours. If there is minor oozing, apply another Band-aid and remove it after 12 hours.    For 2 days, do NOT have sex or do any heavy exercise.    Do NOT take a bath, or use a hot tub or pool for at least 3 days. You may shower.    Care of wrist or arm site  It is normal to have soreness at the puncture site and mild tingling in your hand for up to 3 days.    For 2 days, do not use your hand or arm to support your weight (such as rising from a chair) or bend your wrist (such as lifting a garage door).    For 2 days, do not lift more than 5 pounds or exercise your arm (tennis, golf or bowling).    If you start bleeding from the site in your arm:    Sit down and press firmly on the site with your fingers for 10 minutes. Call your doctor as soon as you can.    If the bleeding stops, sit still and keep your wrist straight for 2 hours.    Medicines    If you have started taking Plavix or Effient, do not stop taking it until you talk to your heart doctor (cardiologist).    If you are on metformin (Glucophage), do not restart it until you have blood tests (within 2 to 3 days after discharge). When your doctor tells you it is safe, you may restart the metformin.    If you have stopped any other medicines, check with your nurse or provider about when to restart them.    Call 911 right away if you have bleeding that is heavy or does not stop.    Call your doctor if:    You have a large or growing hard lump around the site.    The  site is red, swollen, hot or tender.    Blood or fluid is draining from the site.    You have chills or a fever greater than 101 F (38 C).    Your leg or arm feels numb or cool.    You have hives, a rash or unusual itching.      [facility] [place]:  [phone] (7 days a week)

## 2021-07-23 NOTE — Clinical Note
The first balloon was inserted into the left anterior descending and proximal left anterior descending.Max pressure = 12 naomi. Total duration = 8 seconds.     Max pressure = 14 naomi. Total duration = 8 seconds.    Balloon reinflated a second time: Max pressure = 14 naomi. Total duration = 8 seconds.

## 2021-07-23 NOTE — Clinical Note
The first balloon was inserted into the left anterior descending and middle left anterior descending.Max pressure = 12 naomi. Total duration = 8 seconds.

## 2021-07-23 NOTE — Clinical Note
Max pressure = 12 naomi. Total duration = 8 seconds.     Max pressure = 16 naomi. Total duration = 8 seconds.    Balloon reinflated a second time: Max pressure = 16 naomi. Total duration = 8 seconds.

## 2021-07-23 NOTE — PROGRESS NOTES
Arrived from home for an angiogram.  VSS.  C/O terrible headache at base of posterior skull.  Ice packs with some ease.  Headache is constant and caused by the Imdur, per Chao.  H&P current.  Consent obtained.  Ready for procedure.

## 2021-07-23 NOTE — Clinical Note
Single balloon inflation. The first balloon was inserted into the left anterior descending and middle left anterior descending.Max pressure = 14 naomi. Total duration = 8 seconds.

## 2021-07-23 NOTE — Clinical Note
The first balloon was inserted into the left anterior descending.Max pressure = 16 naomi. Total duration = 8 seconds.

## 2021-07-23 NOTE — Clinical Note
The first balloon was inserted into the left anterior descending.Max pressure = 18 naomi. Total duration = 8 seconds.

## 2021-07-23 NOTE — Clinical Note
The first balloon was inserted into the circumflex and proximal circumflex.Max pressure = 14 naomi. Total duration = 10 seconds.

## 2021-07-23 NOTE — Clinical Note
The first balloon was inserted into the left anterior descending.Max pressure = 14 naomi. Total duration = 6 seconds.

## 2021-07-23 NOTE — Clinical Note
Max pressure = 12 naomi. Total duration = 8 seconds.     Max pressure = 16 naomi. Total duration = 6 seconds.    Balloon reinflated a second time: Max pressure = 16 naomi. Total duration = 6 seconds.

## 2021-07-23 NOTE — Clinical Note
The first balloon was inserted into the left anterior descending and proximal left anterior descending.Max pressure = 14 naomi. Total duration = 8 seconds.

## 2021-07-23 NOTE — Clinical Note
Single balloon inflation. The first balloon was inserted into the left anterior descending and proximal left anterior descending.Max pressure = 18 naomi. Total duration = 8 seconds.

## 2021-07-24 NOTE — PROGRESS NOTES
"D/I/A:  Patient is tolerating liquids and foods, ambulating, urinating, puncture sites are stable ( no bleeding and no hematoma) and patient has a .  A+O x4 and making needs known.  Left radial CCL access site C/D/I; no bleeding or hematoma; CMS intact.  VSSA. Provider notified regarding elevated blood pressure - see note. Sinus rhythm/beverly on monitor.  IV access removed.  Education completed and outlined in AVS or handout: medications reviewed with patient.  Questions answered prior to discharge.  Belongings returned to patient at discharge - states all belongings present.      P: Discharged to self care.  Patient to follow up with appts as per discharge instruction.    BP (!) 165/97   Pulse 56   Temp 97.9  F (36.6  C) (Oral)   Resp 18   Ht 1.803 m (5' 11\")   Wt 91.6 kg (202 lb)   SpO2 97%   BMI 28.17 kg/m        "

## 2021-07-24 NOTE — PROGRESS NOTES
"Notified cards fellow regarding elevated blood pressures. No new orders at this time. Confirmed that patient is okay to go home with current vital signs.     BP (!) 165/97   Pulse 56   Temp 97.9  F (36.6  C) (Oral)   Resp 18   Ht 1.803 m (5' 11\")   Wt 91.6 kg (202 lb)   SpO2 97%   BMI 28.17 kg/m      "

## 2021-07-26 LAB
ATRIAL RATE - MUSE: 62 BPM
DIASTOLIC BLOOD PRESSURE - MUSE: NORMAL MMHG
INTERPRETATION ECG - MUSE: NORMAL
P AXIS - MUSE: 24 DEGREES
PR INTERVAL - MUSE: 174 MS
QRS DURATION - MUSE: 98 MS
QT - MUSE: 428 MS
QTC - MUSE: 434 MS
R AXIS - MUSE: 26 DEGREES
SYSTOLIC BLOOD PRESSURE - MUSE: NORMAL MMHG
T AXIS - MUSE: 38 DEGREES
VENTRICULAR RATE- MUSE: 62 BPM

## 2021-07-27 ENCOUNTER — HOSPITAL ENCOUNTER (OUTPATIENT)
Dept: CARDIAC REHAB | Facility: CLINIC | Age: 66
End: 2021-07-27
Attending: STUDENT IN AN ORGANIZED HEALTH CARE EDUCATION/TRAINING PROGRAM
Payer: COMMERCIAL

## 2021-07-27 DIAGNOSIS — Z98.890 STATUS POST CORONARY ANGIOGRAM: ICD-10-CM

## 2021-07-27 LAB
ATRIAL RATE - MUSE: 64 BPM
DIASTOLIC BLOOD PRESSURE - MUSE: NORMAL MMHG
INTERPRETATION ECG - MUSE: NORMAL
P AXIS - MUSE: -9 DEGREES
PR INTERVAL - MUSE: 172 MS
QRS DURATION - MUSE: 106 MS
QT - MUSE: 428 MS
QTC - MUSE: 441 MS
R AXIS - MUSE: -6 DEGREES
SYSTOLIC BLOOD PRESSURE - MUSE: NORMAL MMHG
T AXIS - MUSE: 10 DEGREES
VENTRICULAR RATE- MUSE: 64 BPM

## 2021-07-27 PROCEDURE — 93797 PHYS/QHP OP CAR RHAB WO ECG: CPT | Mod: 59 | Performed by: REHABILITATION PRACTITIONER

## 2021-07-27 PROCEDURE — 93798 PHYS/QHP OP CAR RHAB W/ECG: CPT | Performed by: REHABILITATION PRACTITIONER

## 2021-08-02 ENCOUNTER — HOSPITAL ENCOUNTER (OUTPATIENT)
Dept: CARDIAC REHAB | Facility: CLINIC | Age: 66
End: 2021-08-02
Attending: STUDENT IN AN ORGANIZED HEALTH CARE EDUCATION/TRAINING PROGRAM
Payer: COMMERCIAL

## 2021-08-02 PROCEDURE — 93798 PHYS/QHP OP CAR RHAB W/ECG: CPT | Performed by: REHABILITATION PRACTITIONER

## 2021-08-04 ENCOUNTER — OFFICE VISIT (OUTPATIENT)
Dept: CARDIOLOGY | Facility: CLINIC | Age: 66
End: 2021-08-04
Payer: COMMERCIAL

## 2021-08-04 VITALS
OXYGEN SATURATION: 98 % | WEIGHT: 197.2 LBS | HEART RATE: 80 BPM | SYSTOLIC BLOOD PRESSURE: 128 MMHG | DIASTOLIC BLOOD PRESSURE: 87 MMHG | BODY MASS INDEX: 27.5 KG/M2

## 2021-08-04 DIAGNOSIS — I25.110 CORONARY ARTERY DISEASE INVOLVING NATIVE CORONARY ARTERY OF NATIVE HEART WITH UNSTABLE ANGINA PECTORIS (H): Primary | ICD-10-CM

## 2021-08-04 PROCEDURE — 99215 OFFICE O/P EST HI 40 MIN: CPT | Performed by: STUDENT IN AN ORGANIZED HEALTH CARE EDUCATION/TRAINING PROGRAM

## 2021-08-04 RX ORDER — LISINOPRIL 5 MG/1
5 TABLET ORAL DAILY
Qty: 90 TABLET | Refills: 3 | Status: SHIPPED | OUTPATIENT
Start: 2021-08-04 | End: 2021-12-08

## 2021-08-04 NOTE — PROGRESS NOTES
Chief Complaint: Chest pain     HPI: Chao Quezada is a 65 year old male with a past medical history of dyslpidemia who was referred to me for evaluation of chest pain by Mr. Sony Combs. He came to the appointment with his wife today.     At baseline, Mr. Quezada is a .  His work involves walking up dozens of flights of stairs a day and spending most of the day on his feet.  He notes that he was able to perform this work in an unrestricted fashion until approximately 3 months ago.  At this time, he noted that he had onset of central chest tightness when he was walking.  The chest tightness has continued to recur with activity and, in fact, become more frequent, further limiting his activity tolerance.  He notes that in the last month he has only been able to walk one flight of stairs before he has the onset of the chest tightness.  He describes this as being central but, more recently, also radiating to his left arm.  He has been able to relieve self of the chest tightness by resting.  Briefly, Mr. Quezada saw Julieta Lauryn for evaluation of this chest tightness last month.  At the time, he was given as needed nitroglycerin tablets, started on atorvastatin, and referred for an exercise stress echo.  Mr. Quezada only achieved 69% of his target heart rate but he developed wall motion abnormalities in the LAD territory.  At the stage, he was referred to cardiology for further evaluation.  Of note, Mr. Quezada notes that he has had some relief with the nitroglycerin tablets, but he does sometimes have headache with them.    At the time of the consultation, he notes an absence of chest pain at rest, dyspnea at rest or with exertion, PND, orthopnea, peripheral edema, palpitations, hemoptysis, fever, cough, lightheadedness or syncope.     A comprehensive ROS was done and the details are included above in the HPI.    Interval History 08/04/2021:  Mr. Quezada underwent coronary angiography and PCI to the LAD and  LCx on 07/23/21. Since then, he has not had a single episode of angina. He has been walking his dog and playing with his three grandchildren regularly and also pursuing formal cardiac rehab. No sequelae of bleeding. He has had multiple episodes of headache with ISMN and, while this has improved somewhat, this continues on a daily basis.     A comprehensive ROS was done and was otherwise negative.     Past Medical History:  - Dyslipidemia, started on statin in 06/2021   - No prior history of hypertension, T2DM, CAD, TIA/stroke, vascular aneurysms, PAD  Past Medical History:   Diagnosis Date     Backache, unspecified      Dermatophytosis of nail 1998     Headache        Past Surgical History:    Past Surgical History:   Procedure Laterality Date     C ANESTH,KNEE JOINT; NOS  1973    right knee, with hx of dislocaton of knee cap     COLONOSCOPY WITH CO2 INSUFFLATION N/A 11/30/2016    Procedure: COLONOSCOPY WITH CO2 INSUFFLATION;  Surgeon: Duane, William Charles, MD;  Location: [facility] OR     CV CORONARY ANGIOGRAM N/A 7/23/2021    Procedure: CV CORONARY ANGIOGRAM;  Surgeon: Kale Singh MD;  Location: [facility] CARDIAC CATH LAB     CV PCI ANGIOPLASTY N/A 7/23/2021    Procedure: Percutaneous Transluminal Angioplasty;  Surgeon: Kale Singh MD;  Location: [facility] CARDIAC CATH LAB     CV PCI STENT DRUG ELUTING N/A 7/23/2021    Procedure: Percutaneous Coronary Intervention Stent Drug Eluting;  Surgeon: Kale Singh MD;  Location:  [facility]     HC EXPLORATION ANKLE JOINT  1994    right ankle fracture with pins and screws     ZZ COLONOSCOPY W/WO BRUSH/WASH  1/24/2005        Drug History:  Home cardiac meds: Aspirin 81 mg once daily, atorvastatin 80 mg once daily, ISMN 30 mg q24h, ticagrelor 90 mg BID   Current Outpatient Medications   Medication Sig Dispense Refill     aspirin (ASA) 81 MG EC tablet Take 1 tablet (81 mg) by mouth daily 90 tablet 1     atorvastatin (LIPITOR) 80 MG tablet Take 1 tablet  (80 mg) by mouth daily 90 tablet 3     lisinopril (ZESTRIL) 5 MG tablet Take 1 tablet (5 mg) by mouth daily 90 tablet 3     nitroGLYcerin (NITROSTAT) 0.4 MG sublingual tablet For chest pain place 1 tablet under the tongue every 5 minutes for 3 doses. If symptoms persist 5 minutes after 1st dose call 911. 25 tablet 0     terbinafine (LAMISIL) 250 MG tablet Take 1 tablet (250 mg) by mouth daily 90 tablet 0     ticagrelor (BRILINTA) 90 MG tablet Take 1 tablet (90 mg) by mouth 2 times daily Start tomorrow morning. 180 tablet 3     butalbital-acetaminophen-caffeine (ESGIC) -40 MG tablet Take 1 tablet by mouth every 4 hours as needed (Patient not taking: Reported on 8/4/2021) 28 tablet 1         Family History:  - No family history of sudden cardiac death, premature CAD, valvular disorders  Family History   Problem Relation Age of Onset     Cancer Father         throat, prostate     Prostate Cancer Father      Diabetes Mother      Cancer Brother        Social History:    Social History     Tobacco Use     Smoking status: Never Smoker     Smokeless tobacco: Never Used     Tobacco comment: never smoked   Substance Use Topics     Alcohol use: Yes     Comment: rare       Allergies   Allergen Reactions     No Known Drug Allergies          Physical Examination:  Vitals: /87 (BP Location: Left arm, Patient Position: Sitting, Cuff Size: Adult Regular)   Pulse 80   Wt 89.4 kg (197 lb 3.2 oz)   SpO2 98%   BMI 27.50 kg/m    BMI= Body mass index is 27.5 kg/m .    GENERAL: Healthy, alert and no distress  Eyes: No xanthelasmas.  NECK: No palpable neck masses/goitre and no evidence of retrosternal goitre.   ENT: Moist mucosal membranes.  RESPIRATORY: No signs of resp distress. Lungs CTAB.  CARDIOVASCULAR:  No JVD, regular, normal S1+S2 without added sounds or murmurs.  ABDOMEN: ND, soft, non-tender, normal bowel sounds.  EXTREMITIES: Warm, well-perfused, no edema.   NEUROLOGY: GCS 15/15, no focal  deficits.  VASC: No carotid bruits. LRA access site with minor bruising. C4-c8 dermatomes NVI bilaterally. No thrill/bruit over access sites.   SKIN: No ecchymoses, no rashes.  PSYCH: Cooperative, pleasant affect.       Investigations:  I personally viewed and interpreted the following investigations:    Labs:    CMP RESULTS:  Lab Results   Component Value Date     07/23/2021     01/23/2018    POTASSIUM 4.2 07/23/2021    POTASSIUM 4.1 01/23/2018    CHLORIDE 108 07/23/2021    CHLORIDE 105 01/23/2018    CO2 27 07/23/2021    CO2 26 01/23/2018    ANIONGAP 5 07/23/2021    ANIONGAP 9 01/23/2018    GLC 85 07/23/2021    GLC 74 01/23/2018    BUN 19 07/23/2021    BUN 14 01/23/2018    CR 1.02 07/23/2021    CR 0.97 01/23/2018    GFRESTIMATED 76 07/23/2021    GFRESTIMATED 78 01/23/2018    GFRESTBLACK >90 01/23/2018    NICKY 8.8 07/23/2021    NICKY 9.0 01/23/2018    BILITOTAL 0.5 10/27/2016    ALBUMIN 4.1 10/27/2016    ALKPHOS 65 10/27/2016    ALT 23 10/27/2016    AST 19 10/27/2016          LIPIDS:  Lab Results   Component Value Date    CHOL 203 06/16/2021     Lab Results   Component Value Date    HDL 52 06/16/2021     Lab Results   Component Value Date     06/16/2021     Lab Results   Component Value Date    TRIG 49 06/16/2021     Lab Results   Component Value Date    CHOLHDLRATIO 3.9 09/04/2015       Recent Tests:    Electrocardiogram (07/16/2021):  Sinus bradycardia, nl axis/intervals, no ischemic changes.     Echocardiogram (07/12/2021):     Abnormal exercise stress echocardiogram.  Test terminated at 69% MPHR due to chest pain and wall motion abnormality.  Normal resting images with EF 55-60%. No resting wall motion abnormalities.  With stress no increase in LV EF,patient had chest pain and LAD territory  akinesis.  No EKG evidence for ischemia.  Blunted BP response to exercise.  Normal functional capacity.  Normal baseline screening echocardiogram with no significant valvular  abnormalities. Mild mascending  aortc dilation.    Coronary Angiogram 07/23/2021:      >>LM: No significant lesion    >>LAD: Diffusely diseased. Proximal LAD has 95% lesion followed by a long 50-60% mid LAD lesion. D1 ostium has a 40% lesion.    >>LCX: 95% lesion in the proximal segment.    >>RCA: 20% lesion in the proximal segment.    S/p successful PCI of the proximal to mid LAD using 3 overlapping stents (Synergy JUSTINE 3.5*32 mm, 3.0*32 mm, 2.5*38 mm from proximal to distal) - Procedure details below    S/p successful PCI of the LCX using a Synergy JUSTINE 3.5*24 mm - Procedure details below    Assessment and Plan:   Chao Quezada is an extremely pleasant 65-year old male with a past medical history of dyslipidemia who presented to me for evaluation of unstable angina.    Mr. Quezada's management will focus on lifestyle management and medical therapy. I talked to him at length about the medical therapy option Since his BP was in the stage I phase previously and he has had an ACS, we decided to start lisinopril 5 mg q24h today.  He is hesitant to start metoprolol for anginal prophylaxis and/or prognostic benefit because he is had bradycardia in the past.  Moreover, he continues to have headache with ISMN without any real symptomatic benefit from isosorbide mononitrate given that his angina is been completely relieved by the PCI.  In view of this, we will stop his Imdur today.    I spoke to Mr. Quezada at length about things and exercise regimen and dietary management. He will continue to increase his exercise level until he is doing 30 minutes of moderate exercise 5 times weekly.  I also asked him to consider the Mediterranean diet, but on a more basic level, increase his intake of fruits and vegetables on a daily basis and consider intake of more lean meats.     I would kindly ask that Mr. Quezada's medical team reach out to me before stopping or interrupting his antiplatelet therapy before 07/24/2022 given the high-risk nature of this.      Problems:  1. Coronary artery disease with PCI to LAD and LCx for ACS (unstable angina) on 07/23/2021   2. Dyslipidemia  3. Ascending aorta of 4.1 cm on TTE (indexed value of 1.9 cm/m2)  4. Hypertension, stage I       Plan:  - Start lisinopril 5 mg q24h for stage I hypertension and prognostic benefit after ACS  - Continue ASA 81 mg q24h lifelong   - Consider starting metoprolol tartrate at next visit for anginal prophylaxis if HR permits   - Continue ticagrelor 90 mg BID till and including 07/24/2022 in an uninterrupted fashion   - Continue atorvastatin 80 mg q24h   - No further aortic surveillance needed given that the ascending aorta is normal in caliber when indexed to BSA  - RTC three months to evaluate symptoms     Chart review time: 10  Visit time: 45  Total time spent: 55  >50% of this 55-minute visit were spent with the patient on in-person counseling and discussion regarding ACS, angina, hypertension.      Lucretia Soto, MS    Cardiovascular Division  Pager [phone]    CC  Patient Care Team:  Sony Combs PA-C as PCP - General (Physician Assistant)  Sony Combs PA-C as Assigned PCP  Angel Bansal MD as Assigned Heart and Vascular Provider

## 2021-08-04 NOTE — PATIENT INSTRUCTIONS
You were seen at the [facility] clinic today.   You saw Dr. Angel Bansal, [employer], [place].    The following is a summary of your office visit today:    1. Start lisinopril 5 mg once daily. Message me if you have a dry cough.  2. Stop taking isosorbide mononitrate.  3. Continue regular exercise 30 minutes, five days a week. Continue cardiac rehab.  4. Reduce dietary sugars, increase fruits and vegetables  5. Follow-up with me three months    Nurse contact information:    Cardiology Care Coordinators: Amira Rice RN and Vamsi Medeiros RN      Phone: [phone]   Fax: [phone]      HOW TO CHECK YOUR BLOOD PRESSURE AT HOME:     Avoid eating, smoking, and exercising for at least 30 minutes before taking a reading.    Be sure you have taken your BP medication at least 2-3 hours before you check it.     Sit quietly for 10 minutes before a reading.     Sit in a chair with your feet flat on the floor. Rest your  arm on a table so that the arm cuff is at the same level as your heart.    Remain still during the reading.    Record your blood pressure and pulse in a log and bring to your next appointment.     If you have had any blood work, imaging or other testing completed we will be in touch within 1-2 weeks regarding the results. If you have any questions, concerns or need to schedule a follow up, please contact us at [phone]. If you are needing refills please contact your pharmacy. For urgent after hour care please call the [facility] at [phone] or the [facility] at [phone] and ask to speak to the on-call Cardiologist.    It was a pleasure meeting with you today. Please let us know if there is anything else we can do for you so that we can be sure you are leaving completely satisfied with your care experience.     Your Cardiology Team at [facility]  RN Care Coordinators: Marni BALDWIN:  Guerda

## 2021-08-09 ENCOUNTER — HOSPITAL ENCOUNTER (OUTPATIENT)
Dept: CARDIAC REHAB | Facility: CLINIC | Age: 66
End: 2021-08-09
Attending: STUDENT IN AN ORGANIZED HEALTH CARE EDUCATION/TRAINING PROGRAM
Payer: COMMERCIAL

## 2021-08-09 PROCEDURE — 93798 PHYS/QHP OP CAR RHAB W/ECG: CPT | Performed by: REHABILITATION PRACTITIONER

## 2021-08-16 ENCOUNTER — HOSPITAL ENCOUNTER (OUTPATIENT)
Dept: CARDIAC REHAB | Facility: CLINIC | Age: 66
End: 2021-08-16
Attending: STUDENT IN AN ORGANIZED HEALTH CARE EDUCATION/TRAINING PROGRAM
Payer: COMMERCIAL

## 2021-08-16 PROCEDURE — 93798 PHYS/QHP OP CAR RHAB W/ECG: CPT

## 2021-08-23 ENCOUNTER — HOSPITAL ENCOUNTER (OUTPATIENT)
Dept: CARDIAC REHAB | Facility: CLINIC | Age: 66
End: 2021-08-23
Attending: STUDENT IN AN ORGANIZED HEALTH CARE EDUCATION/TRAINING PROGRAM
Payer: COMMERCIAL

## 2021-08-23 PROCEDURE — 93798 PHYS/QHP OP CAR RHAB W/ECG: CPT | Performed by: REHABILITATION PRACTITIONER

## 2021-08-24 ENCOUNTER — HOSPITAL ENCOUNTER (OUTPATIENT)
Dept: CARDIAC REHAB | Facility: CLINIC | Age: 66
End: 2021-08-24
Attending: STUDENT IN AN ORGANIZED HEALTH CARE EDUCATION/TRAINING PROGRAM
Payer: COMMERCIAL

## 2021-08-24 PROCEDURE — 93798 PHYS/QHP OP CAR RHAB W/ECG: CPT | Performed by: REHABILITATION PRACTITIONER

## 2021-10-07 NOTE — PROGRESS NOTES
1. Upper respiratory tract infection, unspecified type  - azithromycin (ZITHROMAX) 250 MG tablet; Take 2 tablets (500 mg) by mouth daily for 1 day, THEN 1 tablet (250 mg) daily for 4 days.  Dispense: 6 tablet; Refill: 0  - guaiFENesin-codeine (GUAIFENESIN AC) 100-10 MG/5ML syrup; Take 5 mLs by mouth nightly as needed for congestion  Dispense: 118 mL; Refill: 0  - Symptomatic COVID-19 Virus (Coronavirus) by PCR Nose    2. Coronary artery disease involving native coronary artery of native heart with unstable angina pectoris (H)  - Lipid Profile      Patient Active Problem List    Diagnosis Date Noted     Status post coronary angiogram 07/23/2021     Priority: Medium     Coronary artery disease involving native coronary artery of native heart with unstable angina pectoris (H) 07/21/2021     Priority: Medium     Added automatically from request for surgery [id number]       Chest pain, unspecified type 07/21/2021     Priority: Medium     Added automatically from request for surgery [id number]       Hyperlipidemia with target LDL less than 130 04/21/2015     Priority: Medium     Diagnosis updated by automated process. Provider to review and confirm.       Current Outpatient Medications   Medication     aspirin (ASA) 81 MG EC tablet     atorvastatin (LIPITOR) 80 MG tablet     butalbital-acetaminophen-caffeine (ESGIC) -40 MG tablet     lisinopril (ZESTRIL) 5 MG tablet     nitroGLYcerin (NITROSTAT) 0.4 MG sublingual tablet     terbinafine (LAMISIL) 250 MG tablet     ticagrelor (BRILINTA) 90 MG tablet     No current facility-administered medications for this visit.       Criss Guidry is a 65 year old who presents for the following health issues     HPI     *patient requesting lipid profile    Acute Illness  Acute illness concerns: cough  Onset/Duration: 3 weeks  Symptoms:  Fever: no  Chills/Sweats: no  Headache (location?): no  Sinus Pressure: YES  Conjunctivitis:  no  Ear Pain: no  Rhinorrhea: YES  Congestion:  YES  Sore Throat: YES  Cough: YES-non-productive, waxing and waning over time  Wheeze: no  Decreased Appetite: no  Nausea: no  Vomiting: no  Diarrhea: no  Dysuria/Freq.: no  Dysuria or Hematuria: no  Fatigue/Achiness: no  Sick/Strep Exposure: no  Therapies tried and outcome: nyquil- somewhat helpful       1. Cough: Ongoing for the past 3 weeks.  Mild fever.  COVID-19 testing was negative two weeks ago.  Other sick contact.  Last weekend lost voice.  Lasting 2 days.  Dry cough.  Congestion.  Nasal discharge.  No recent fever.  Normal appetite.  Energy is normal.  Tried OTC liquid with minimal improvement.  No fevers or chills.  Non-smoker.     Review of Systems   Constitutional: Negative for chills and fever.   HENT: Positive for congestion. Negative for ear pain, hearing loss and sore throat.    Respiratory: Positive for cough. Negative for shortness of breath.    Cardiovascular: Negative for chest pain, palpitations and peripheral edema.   Musculoskeletal: Negative for arthralgias, joint swelling and myalgias.   Skin: Negative for rash.   Neurological: Negative for dizziness, weakness, headaches and paresthesias.   Psychiatric/Behavioral: Negative for mood changes. The patient is not nervous/anxious.          Objective    /72 (BP Location: Right arm, Cuff Size: Adult Large)   Pulse (!) 48   Temp 97.5  F (36.4  C) (Tympanic)   Wt 87.5 kg (192 lb 12.8 oz)   SpO2 99%   BMI 26.89 kg/m    Body mass index is 26.89 kg/m .  Physical Exam  Constitutional:       General: He is not in acute distress.     Appearance: He is well-developed.   HENT:      Head: Normocephalic and atraumatic.      Nose: Nose normal.   Eyes:      Conjunctiva/sclera: Conjunctivae normal.   Neck:      Trachea: No tracheal deviation.   Cardiovascular:      Rate and Rhythm: Normal rate and regular rhythm.      Heart sounds: Normal heart sounds.   Pulmonary:      Effort: Pulmonary effort is normal.      Breath sounds: No wheezing.    Musculoskeletal:         General: Normal range of motion.      Cervical back: Normal range of motion.   Skin:     Findings: No erythema or rash.   Neurological:      Mental Status: He is alert and oriented to person, place, and time.   Psychiatric:         Behavior: Behavior normal.

## 2021-10-08 ENCOUNTER — OFFICE VISIT (OUTPATIENT)
Dept: FAMILY MEDICINE | Facility: CLINIC | Age: 66
End: 2021-10-08
Payer: COMMERCIAL

## 2021-10-08 VITALS
WEIGHT: 192.8 LBS | SYSTOLIC BLOOD PRESSURE: 114 MMHG | HEART RATE: 48 BPM | BODY MASS INDEX: 26.89 KG/M2 | DIASTOLIC BLOOD PRESSURE: 72 MMHG | TEMPERATURE: 97.5 F | OXYGEN SATURATION: 99 %

## 2021-10-08 DIAGNOSIS — J06.9 UPPER RESPIRATORY TRACT INFECTION, UNSPECIFIED TYPE: Primary | ICD-10-CM

## 2021-10-08 DIAGNOSIS — I25.110 CORONARY ARTERY DISEASE INVOLVING NATIVE CORONARY ARTERY OF NATIVE HEART WITH UNSTABLE ANGINA PECTORIS (H): ICD-10-CM

## 2021-10-08 LAB
CHOLEST SERPL-MCNC: 113 MG/DL
FASTING STATUS PATIENT QL REPORTED: YES
HDLC SERPL-MCNC: 49 MG/DL
LDLC SERPL CALC-MCNC: 55 MG/DL
NONHDLC SERPL-MCNC: 64 MG/DL
TRIGL SERPL-MCNC: 43 MG/DL

## 2021-10-08 PROCEDURE — 36415 COLL VENOUS BLD VENIPUNCTURE: CPT | Performed by: FAMILY MEDICINE

## 2021-10-08 PROCEDURE — 80061 LIPID PANEL: CPT | Performed by: FAMILY MEDICINE

## 2021-10-08 PROCEDURE — U0003 INFECTIOUS AGENT DETECTION BY NUCLEIC ACID (DNA OR RNA); SEVERE ACUTE RESPIRATORY SYNDROME CORONAVIRUS 2 (SARS-COV-2) (CORONAVIRUS DISEASE [COVID-19]), AMPLIFIED PROBE TECHNIQUE, MAKING USE OF HIGH THROUGHPUT TECHNOLOGIES AS DESCRIBED BY CMS-2020-01-R: HCPCS | Performed by: FAMILY MEDICINE

## 2021-10-08 PROCEDURE — 99213 OFFICE O/P EST LOW 20 MIN: CPT | Performed by: FAMILY MEDICINE

## 2021-10-08 RX ORDER — CODEINE PHOSPHATE/GUAIFENESIN 10-100MG/5
5 LIQUID (ML) ORAL
Qty: 118 ML | Refills: 0 | Status: SHIPPED | OUTPATIENT
Start: 2021-10-08 | End: 2023-02-01

## 2021-10-08 RX ORDER — AZITHROMYCIN 250 MG/1
TABLET, FILM COATED ORAL
Qty: 6 TABLET | Refills: 0 | Status: SHIPPED | OUTPATIENT
Start: 2021-10-08 | End: 2021-10-13

## 2021-10-08 ASSESSMENT — PAIN SCALES - GENERAL: PAINLEVEL: NO PAIN (0)

## 2021-10-08 ASSESSMENT — ENCOUNTER SYMPTOMS: COUGH: 1

## 2021-10-08 NOTE — PATIENT INSTRUCTIONS
Brent Guidry,    Thank you for allowing [facility] to manage your care.    I sent your prescriptions to your pharmacy.    If you have any questions or concerns, please feel free to call us at [phone].    Sincerely,    Dr. Radford    Did you know?      You can schedule a video visit for follow-up appointments as well as future appointments for certain conditions.  Please see the below link.     [email or web address]    If you have not already done so,  I encourage you to sign up for [email or web address]).  This will allow you to review your results, securely communicate with a provider, and schedule virtual visits as well.

## 2021-10-08 NOTE — LETTER
October 13, 2021      Chao Quezada  [address] [place] [address]        Dear Mr. Quezada,    We are writing to inform you of your test results.    Your recent COVID-19 PCR (active form of infection) screening is negative.      This is reassuring news.      Resulted Orders   Symptomatic COVID-19 Virus (Coronavirus) by PCR Nose   Result Value Ref Range    SARS CoV2 PCR Negative Negative      Comment:      NEGATIVE: SARS-CoV-2 (COVID-19) RNA not detected, presumed negative.    Narrative    Testing was performed using the [employer] SARS-CoV-2 Assay on the  [employer] Instrument System. Additional information about this  Emergency Use Authorization (EUA) assay can be found via the Lab  Guide. This test should be ordered for the detection of SARS-CoV-2 in  individuals who meet SARS-CoV-2 clinical and/or epidemiological  criteria. Test performance is unknown in asymptomatic patients. This  test is for in vitro diagnostic use under the FDA EUA for  laboratories certified under CLIA to perform high complexity testing.  This test has not been FDA cleared or approved. A negative result  does not rule out the presence of PCR inhibitors in the specimen or  target RNA in concentration below the limit of detection for the  assay. The possibility of a false negative should be considered if  the patient's recent exposure or clinical presentation suggests  COVID-19. This test was validated by the [facility]. This laboratory is certified under  the Clinical Laboratory Improvement Amendments of 1988 (CLIA-88) as  qualified to perform high complexity laboratory testing.               If you have any questions or concerns, please call the clinic at the number listed above.       Sincerely,      Denis Radford, DO

## 2021-10-11 LAB — SARS-COV-2 RNA RESP QL NAA+PROBE: NEGATIVE

## 2021-10-11 ASSESSMENT — ENCOUNTER SYMPTOMS
DIZZINESS: 0
MYALGIAS: 0
PALPITATIONS: 0
CHILLS: 0
SORE THROAT: 0
HEADACHES: 0
ARTHRALGIAS: 0
FEVER: 0
PARESTHESIAS: 0
JOINT SWELLING: 0
WEAKNESS: 0
NERVOUS/ANXIOUS: 0
SHORTNESS OF BREATH: 0

## 2021-10-23 ENCOUNTER — HEALTH MAINTENANCE LETTER (OUTPATIENT)
Age: 66
End: 2021-10-23

## 2021-11-04 DIAGNOSIS — I25.110 CORONARY ARTERY DISEASE INVOLVING NATIVE CORONARY ARTERY OF NATIVE HEART WITH UNSTABLE ANGINA PECTORIS (H): ICD-10-CM

## 2021-11-06 RX ORDER — ATORVASTATIN CALCIUM 80 MG/1
80 TABLET, FILM COATED ORAL DAILY
Qty: 90 TABLET | Refills: 2 | Status: SHIPPED | OUTPATIENT
Start: 2021-11-06 | End: 2022-08-03

## 2021-11-08 ENCOUNTER — ALLIED HEALTH/NURSE VISIT (OUTPATIENT)
Dept: NURSING | Facility: CLINIC | Age: 66
End: 2021-11-08
Payer: COMMERCIAL

## 2021-11-08 DIAGNOSIS — Z23 HIGH PRIORITY FOR 2019-NCOV VACCINE: Primary | ICD-10-CM

## 2021-11-08 PROCEDURE — 0064A COVID-19,PF,MODERNA (18+ YRS BOOSTER .25ML): CPT

## 2021-11-08 PROCEDURE — 90662 IIV NO PRSV INCREASED AG IM: CPT

## 2021-11-08 PROCEDURE — 91306 COVID-19,PF,MODERNA (18+ YRS BOOSTER .25ML): CPT

## 2021-11-08 PROCEDURE — 99207 PR NO CHARGE NURSE ONLY: CPT

## 2021-11-08 PROCEDURE — G0008 ADMIN INFLUENZA VIRUS VAC: HCPCS

## 2021-12-06 NOTE — PROGRESS NOTES
Chief Complaint: Chest pain     HPI: Chao Quezada is a 65 year old male with a past medical history of dyslpidemia who was referred to me for evaluation of chest pain by Mr. Sony Combs. He came to the appointment with his wife today.     At baseline, Mr. Quezada is a .  His work involves walking up dozens of flights of stairs a day and spending most of the day on his feet.  He notes that he was able to perform this work in an unrestricted fashion until approximately 3 months ago.  At this time, he noted that he had onset of central chest tightness when he was walking.  The chest tightness has continued to recur with activity and, in fact, become more frequent, further limiting his activity tolerance.  He notes that in the last month he has only been able to walk one flight of stairs before he has the onset of the chest tightness.  He describes this as being central but, more recently, also radiating to his left arm.  He has been able to relieve self of the chest tightness by resting.  Briefly, Mr. Quezada saw Julieta Lauryn for evaluation of this chest tightness last month.  At the time, he was given as needed nitroglycerin tablets, started on atorvastatin, and referred for an exercise stress echo.  Mr. Quezada only achieved 69% of his target heart rate but he developed wall motion abnormalities in the LAD territory.  At the stage, he was referred to cardiology for further evaluation.  Of note, Mr. Quezada notes that he has had some relief with the nitroglycerin tablets, but he does sometimes have headache with them.    At the time of the consultation, he notes an absence of chest pain at rest, dyspnea at rest or with exertion, PND, orthopnea, peripheral edema, palpitations, hemoptysis, fever, cough, lightheadedness or syncope.     A comprehensive ROS was done and the details are included above in the HPI.    Interval History 08/04/2021:  Mr. Quezada underwent coronary angiography and PCI to the LAD and  LCx on 07/23/21. Since then, he has not had a single episode of angina. He has been walking his dog and playing with his three grandchildren regularly and also pursuing formal cardiac rehab. No sequelae of bleeding. He has had multiple episodes of headache with ISMN and, while this has improved somewhat, this continues on a daily basis.     A comprehensive ROS was done and was otherwise negative.     Interval History 12/06/2021:  Since his last visit, Mr. Quezada has noted a complete cessation of his angina and headache.  He has returned full-time to work and has been able to take all the steps.  He had one episode of bleeding secondary to trauma from a bird bite while he was moving a bird feeder.  Other than that he has had no sequelae of bleeding.  Mr. Quezada notes that he had a dry cough and nightmares after starting lisinopril.    A comprehensive ROS was done and the details are included above in the HPI.      Past Medical History:  - Dyslipidemia, started on statin in 06/2021   - No prior history of hypertension, T2DM, CAD, TIA/stroke, vascular aneurysms, PAD  Past Medical History:   Diagnosis Date     Backache, unspecified      Dermatophytosis of nail 1998     Headache        Past Surgical History:    Past Surgical History:   Procedure Laterality Date     C ANESTH,KNEE JOINT; NOS  1973    right knee, with hx of dislocaton of knee cap     COLONOSCOPY WITH CO2 INSUFFLATION N/A 11/30/2016    Procedure: COLONOSCOPY WITH CO2 INSUFFLATION;  Surgeon: Duane, William Charles, MD;  Location: [facility] OR     CV CORONARY ANGIOGRAM N/A 7/23/2021    Procedure: CV CORONARY ANGIOGRAM;  Surgeon: Kale Singh MD;  Location:  [facility] CARD[facility] CATH [facility]     CV PCI ANGIOPLASTY N/A 7/23/2021    Procedure: Percutaneous Transluminal Angioplasty;  Surgeon: Kale Singh MD;  Location: [facility] CARDIAC CATH LAB     CV PCI STENT DRUG ELUTING N/A 7/23/2021    Procedure: Percutaneous Coronary Intervention Stent Drug Eluting;  Surgeon: Francisco  Kale DIAMOND MD;  Location:  HEART CARDIAC CATH LAB     HC EXPLORATION ANKLE JOINT  1994    right ankle fracture with pins and screws     ZZHC COLONOSCOPY W/WO BRUSH/WASH  1/24/2005        Drug History:  Home cardiac meds: Aspirin 81 mg once daily, atorvastatin 80 mg once daily, lisinopril 5 mg q24h, ticagrelor 90 mg BID   Current Outpatient Medications   Medication Sig Dispense Refill     aspirin (ASA) 81 MG EC tablet Take 1 tablet (81 mg) by mouth daily 90 tablet 1     atorvastatin (LIPITOR) 80 MG tablet Take 1 tablet (80 mg) by mouth daily 90 tablet 2     butalbital-acetaminophen-caffeine (ESGIC) -40 MG tablet Take 1 tablet by mouth every 4 hours as needed 28 tablet 1     guaiFENesin-codeine (GUAIFENESIN AC) 100-10 MG/5ML syrup Take 5 mLs by mouth nightly as needed for congestion 118 mL 0     losartan (COZAAR) 25 MG tablet Take 1 tablet (25 mg) by mouth daily 90 tablet 3     nitroGLYcerin (NITROSTAT) 0.4 MG sublingual tablet For chest pain place 1 tablet under the tongue every 5 minutes for 3 doses. If symptoms persist 5 minutes after 1st dose call 911. 25 tablet 0     terbinafine (LAMISIL) 250 MG tablet Take 1 tablet (250 mg) by mouth daily 90 tablet 0     ticagrelor (BRILINTA) 90 MG tablet Take 1 tablet (90 mg) by mouth 2 times daily Start tomorrow morning. 180 tablet 3         Family History:  - No family history of sudden cardiac death, premature CAD, valvular disorders  Family History   Problem Relation Age of Onset     Cancer Father         throat, prostate     Prostate Cancer Father      Diabetes Mother      Cancer Brother        Social History:    Social History     Tobacco Use     Smoking status: Never Smoker     Smokeless tobacco: Never Used     Tobacco comment: never smoked   Substance Use Topics     Alcohol use: Yes     Comment: rare       Allergies   Allergen Reactions     No Known Drug Allergies          Physical Examination:  Vitals: /79 (BP Location: Left arm,  "Patient Position: Sitting, Cuff Size: Adult Regular)   Pulse (!) 47   Ht 1.82 m (5' 11.65\")   Wt 87.3 kg (192 lb 8 oz)   SpO2 97%   BMI 26.36 kg/m    BMI= Body mass index is 26.36 kg/m .    GENERAL: Healthy, alert and no distress  Eyes: No xanthelasmas.  ENT: Moist mucosal membranes.  RESPIRATORY: No signs of resp distress.  CARDIOVASCULAR:  No JVD.  ABDOMEN: ND, soft, non-tender, normal bowel sounds.  EXTREMITIES: Warm, well-perfused, no edema. Minor bruising on dorsum of left hand.  NEUROLOGY: GCS 15/15, no focal deficits.  PSYCH: Cooperative, pleasant affect.       Investigations:  I personally viewed and interpreted the following investigations:    Labs:    CMP RESULTS:  Lab Results   Component Value Date     07/23/2021     01/23/2018    POTASSIUM 4.2 07/23/2021    POTASSIUM 4.1 01/23/2018    CHLORIDE 108 07/23/2021    CHLORIDE 105 01/23/2018    CO2 27 07/23/2021    CO2 26 01/23/2018    ANIONGAP 5 07/23/2021    ANIONGAP 9 01/23/2018    GLC 85 07/23/2021    GLC 74 01/23/2018    BUN 19 07/23/2021    BUN 14 01/23/2018    CR 1.02 07/23/2021    CR 0.97 01/23/2018    GFRESTIMATED 76 07/23/2021    GFRESTIMATED 78 01/23/2018    GFRESTBLACK >90 01/23/2018    NICKY 8.8 07/23/2021    NICKY 9.0 01/23/2018    BILITOTAL 0.5 10/27/2016    ALBUMIN 4.1 10/27/2016    ALKPHOS 65 10/27/2016    ALT 23 10/27/2016    AST 19 10/27/2016          LIPIDS:  Lab Results   Component Value Date    CHOL 203 06/16/2021     Lab Results   Component Value Date    HDL 52 06/16/2021     Lab Results   Component Value Date     06/16/2021     Lab Results   Component Value Date    TRIG 49 06/16/2021     Lab Results   Component Value Date    CHOLHDLRATIO 3.9 09/04/2015       Recent Tests:    Electrocardiogram (07/16/2021):  Sinus bradycardia, nl axis/intervals, no ischemic changes.     Echocardiogram (07/12/2021):     Abnormal exercise stress echocardiogram.  Test terminated at 69% MPHR due to chest pain and wall motion " abnormality.  Normal resting images with EF 55-60%. No resting wall motion abnormalities.  With stress no increase in LV EF,patient had chest pain and LAD territory  akinesis.  No EKG evidence for ischemia.  Blunted BP response to exercise.  Normal functional capacity.  Normal baseline screening echocardiogram with no significant valvular  abnormalities. Mild mascending aortc dilation.    Coronary Angiogram 07/23/2021:      >>LM: No significant lesion    >>LAD: Diffusely diseased. Proximal LAD has 95% lesion followed by a long 50-60% mid LAD lesion. D1 ostium has a 40% lesion.    >>LCX: 95% lesion in the proximal segment.    >>RCA: 20% lesion in the proximal segment.    S/p successful PCI of the proximal to mid LAD using 3 overlapping stents (Synergy JUSTINE 3.5*32 mm, 3.0*32 mm, 2.5*38 mm from proximal to distal) - Procedure details below    S/p successful PCI of the LCX using a Synergy JUSTINE 3.5*24 mm - Procedure details below    Assessment and Plan:   Chao Quezada is an extremely pleasant 65-year old male with a past medical history of dyslipidemia who presented to me for evaluation of unstable angina.    Mr. Quezada is doing well with current medical regimen. His BP is markedly improved but his lisinopril is likely leading to his dry cough, which we will switch for losartan. I would kindly request Mr. Quezada's medical team reach out to me before stopping or interrupting his antiplatelet therapy before 07/24/2022 given the high-risk nature of his PCI.     Problems:  1. Coronary artery disease with PCI to LAD and LCx for ACS (unstable angina) on 07/23/2021   2. Dyslipidemia  3. Ascending aorta of 4.1 cm on TTE (indexed value of 1.9 cm/m2)  4. Hypertension, treated      Plan:  - Exchange lisinopril 5 mg once daily for losartan 25 mg once daily  - Continue ASA 81 mg q24h lifelong   - Continue ticagrelor 90 mg BID till and including 07/24/2022 in an uninterrupted fashion   - Continue atorvastatin 80 mg q24h   - No further  aortic surveillance needed given that the ascending aorta is normal in caliber when indexed to BSA  - RTC six months to evaluate symptoms       A total of 40 minutes were spent on the day of the visit for chart review, care coordination, face-to-face consultation with the patient, and documentation.         Lucretia Soto, MS    Cardiovascular Division  Pager [phone]    CC  Patient Care Team:  Sony Combs PA-C as PCP - General (Physician Assistant)  Sony Combs PA-C as Assigned PCP  Angel Bansal MD as Assigned Heart and Vascular Provider

## 2021-12-08 ENCOUNTER — OFFICE VISIT (OUTPATIENT)
Dept: CARDIOLOGY | Facility: CLINIC | Age: 66
End: 2021-12-08
Payer: COMMERCIAL

## 2021-12-08 VITALS
SYSTOLIC BLOOD PRESSURE: 125 MMHG | HEIGHT: 72 IN | OXYGEN SATURATION: 97 % | HEART RATE: 47 BPM | BODY MASS INDEX: 26.07 KG/M2 | DIASTOLIC BLOOD PRESSURE: 79 MMHG | WEIGHT: 192.5 LBS

## 2021-12-08 DIAGNOSIS — I25.110 CORONARY ARTERY DISEASE INVOLVING NATIVE CORONARY ARTERY OF NATIVE HEART WITH UNSTABLE ANGINA PECTORIS (H): Primary | ICD-10-CM

## 2021-12-08 DIAGNOSIS — I10 PRIMARY HYPERTENSION: ICD-10-CM

## 2021-12-08 PROCEDURE — 99215 OFFICE O/P EST HI 40 MIN: CPT | Performed by: STUDENT IN AN ORGANIZED HEALTH CARE EDUCATION/TRAINING PROGRAM

## 2021-12-08 RX ORDER — LOSARTAN POTASSIUM 25 MG/1
25 TABLET ORAL DAILY
Qty: 90 TABLET | Refills: 3 | Status: SHIPPED | OUTPATIENT
Start: 2021-12-08 | End: 2022-07-27

## 2021-12-08 ASSESSMENT — PAIN SCALES - GENERAL: PAINLEVEL: NO PAIN (0)

## 2021-12-08 ASSESSMENT — MIFFLIN-ST. JEOR: SCORE: 1685.68

## 2021-12-08 NOTE — PATIENT INSTRUCTIONS
You were seen at the [facility] Cardiology clinic today.   You saw Dr. Angel Bansal, [facility], [place].    The following is a summary of your office visit today:    1. Change lisinopril tablet to losartan 25 mg once daily   2. Keep up the exercise and diet regimen   3. Follow-up with me in six months     Nurse contact information:    Cardiology Care Coordinators: Amira Rice RN and Vamsi Medeiros RN      Phone: [phone]   Fax: [phone]      HOW TO CHECK YOUR BLOOD PRESSURE AT HOME:     Avoid eating, smoking, and exercising for at least 30 minutes before taking a reading.    Be sure you have taken your BP medication at least 2-3 hours before you check it.     Sit quietly for 10 minutes before a reading.     Sit in a chair with your feet flat on the floor. Rest your  arm on a table so that the arm cuff is at the same level as your heart.    Remain still during the reading.    Record your blood pressure and pulse in a log and bring to your next appointment.     If you have had any blood work, imaging or other testing completed we will be in touch within 1-2 weeks regarding the results. If you have any questions, concerns or need to schedule a follow up, please contact us at [phone]. If you are needing refills please contact your pharmacy. For urgent after hour care please call the [facility] at [phone] or the [facility] at [phone] and ask to speak to the on-call Cardiologist.    It was a pleasure meeting with you today. Please let us know if there is anything else we can do for you so that we can be sure you are leaving completely satisfied with your care experience.     Your Cardiology Team at [facility]  RN Care Coordinators: Marni  CMA: Guerda

## 2021-12-08 NOTE — PROGRESS NOTES
"Chao Quezada's goals for this visit include: Discover cause of persistent cough.     He requests these members of his care team be copied on today's visit information: PCP    PCP: Sony Combs    Referring Provider:  Angel Bansal MD  [address]  [place] [address]    /79 (BP Location: Left arm, Patient Position: Sitting, Cuff Size: Adult Regular)   Pulse (!) 47   Ht 1.82 m (5' 11.65\")   Wt 87.3 kg (192 lb 8 oz)   SpO2 97%   BMI 26.36 kg/m      Do you need any medication refills at today's visit? No.    Jairo Valdez, EMT  Clinic Support  [facility]    [phone]    Employed by [facility] [employer]        "

## 2022-02-12 ENCOUNTER — HEALTH MAINTENANCE LETTER (OUTPATIENT)
Age: 67
End: 2022-02-12

## 2022-07-27 DIAGNOSIS — I25.110 CORONARY ARTERY DISEASE INVOLVING NATIVE CORONARY ARTERY OF NATIVE HEART WITH UNSTABLE ANGINA PECTORIS (H): ICD-10-CM

## 2022-07-27 DIAGNOSIS — I10 PRIMARY HYPERTENSION: Primary | ICD-10-CM

## 2022-07-27 DIAGNOSIS — E78.5 HYPERLIPIDEMIA WITH TARGET LDL LESS THAN 130: Primary | ICD-10-CM

## 2022-07-27 DIAGNOSIS — I10 PRIMARY HYPERTENSION: ICD-10-CM

## 2022-07-27 RX ORDER — LOSARTAN POTASSIUM 25 MG/1
25 TABLET ORAL DAILY
Qty: 90 TABLET | Refills: 3 | Status: SHIPPED | OUTPATIENT
Start: 2022-07-27 | End: 2023-08-22

## 2022-07-27 NOTE — PROGRESS NOTES
Pending Prescriptions:                       Disp   Refills    losartan (COZAAR) 25 MG tablet            90 tab*3            Sig: Take 1 tablet (25 mg) by mouth daily    Last Visit: 12/8/21  Next Visit: 8/3/22  Last Prescribed: 12/8/21    Vamsi Medeiros RN   Cardiology Care Coordinator  [facility]   Phone: [phone]  Fax: [phone]

## 2022-07-27 NOTE — TELEPHONE ENCOUNTER
Pending Prescriptions:                       Disp   Refills    losartan (COZAAR) 25 MG tablet            90 tab*3            Sig: Take 1 tablet (25 mg) by mouth daily    Last Visit: 12/8/21  Next Visit: 8/3/22  Last Prescribed: 12/8/21    Vamsi Medeiros RN   Cardiology Care Coordinator  [facility]   Phone: [phone]  Fax: [phone]

## 2022-07-28 DIAGNOSIS — I25.110 CORONARY ARTERY DISEASE INVOLVING NATIVE CORONARY ARTERY OF NATIVE HEART WITH UNSTABLE ANGINA PECTORIS (H): ICD-10-CM

## 2022-07-28 NOTE — TELEPHONE ENCOUNTER
Patient came into clinic to request a refill of his Brillinta.     Last Prescribed: 7/24/21  Last Visit: 12/8/21  Next Visit: 8/3/22    aVmsi Medeiros RN   Cardiology Care Coordinator  [facility]   Phone: [phone]  Fax: [phone]

## 2022-07-29 DIAGNOSIS — I25.110 CORONARY ARTERY DISEASE INVOLVING NATIVE CORONARY ARTERY OF NATIVE HEART WITH UNSTABLE ANGINA PECTORIS (H): ICD-10-CM

## 2022-07-29 NOTE — TELEPHONE ENCOUNTER
ticagrelor (BRILINTA) 90 MG tablet      Last Written Prescription Date:  7/24/21  Last Fill Quantity: 180,   # refills: 3  Last Office Visit : Angel Bansal MD  Cardiovascular Disease  12/8/2021  [facility] Office visit:  8/3/22    Routing refill request to provider for review/approval because:  Is he to continue past 7/24/22?  Last clinic note:  Plan:  - Exchange lisinopril 5 mg once daily for losartan 25 mg once daily  - Continue ASA 81 mg q24h lifelong   - Continue ticagrelor 90 mg BID till and including 07/24/2022 in an uninterrupted fashion   - Continue atorvastatin 80 mg q24h   - No further aortic surveillance needed given that the ascending aorta is normal in caliber when indexed to BSA  - RTC six months to evaluate symptoms         regular

## 2022-07-29 NOTE — TELEPHONE ENCOUNTER
Angel Bansal MD Wells, Alison M, RN; P [facility] Cardiology Adult [place]  Caller: Unspecified (Yesterday, 10:32 AM)    He can actually stop the ticagrelor as of July 24th, 2022. Can someone please call him and tell him that? He can continue daily ASA 81 mg lifelong as monotherapy.        Called and left message on personal voicemail with above recommendations from Dr Bansal. Left clinic phone number in case he has questions.     Amira Rice RN  Cardiology Care Coordinator  [facility]  Phone: [phone]

## 2022-07-29 NOTE — TELEPHONE ENCOUNTER
[facility] Call Center    Phone Message    May a detailed message be left on voicemail: yes     Reason for Call: Medication Refill Request    Has the patient contacted the pharmacy for the refill? Yes   Name of medication being requested: Tia  Provider who prescribed the medication: Seema  Pharmacy: [id number] - [place], [place] - [address]    Date medication is needed: 07/29/2022         Action Taken: Message routed to:  Other: cardiology    Travel Screening: Not Applicable

## 2022-08-01 NOTE — TELEPHONE ENCOUNTER
See separate encounter from 7/29/33 regarding Ticagrelor. This medication is discontinued.     Amira Rice RN  Cardiology Care Coordinator  [facility]  Phone: [phone]

## 2022-08-02 DIAGNOSIS — I25.110 CORONARY ARTERY DISEASE INVOLVING NATIVE CORONARY ARTERY OF NATIVE HEART WITH UNSTABLE ANGINA PECTORIS (H): ICD-10-CM

## 2022-08-03 ENCOUNTER — OFFICE VISIT (OUTPATIENT)
Dept: CARDIOLOGY | Facility: CLINIC | Age: 67
End: 2022-08-03

## 2022-08-03 ENCOUNTER — LAB (OUTPATIENT)
Dept: LAB | Facility: CLINIC | Age: 67
End: 2022-08-03
Payer: COMMERCIAL

## 2022-08-03 VITALS
SYSTOLIC BLOOD PRESSURE: 132 MMHG | BODY MASS INDEX: 26.58 KG/M2 | OXYGEN SATURATION: 98 % | WEIGHT: 194.1 LBS | DIASTOLIC BLOOD PRESSURE: 80 MMHG | HEART RATE: 48 BPM

## 2022-08-03 DIAGNOSIS — I10 PRIMARY HYPERTENSION: ICD-10-CM

## 2022-08-03 DIAGNOSIS — I25.110 CORONARY ARTERY DISEASE INVOLVING NATIVE CORONARY ARTERY OF NATIVE HEART WITH UNSTABLE ANGINA PECTORIS (H): ICD-10-CM

## 2022-08-03 DIAGNOSIS — E78.5 HYPERLIPIDEMIA WITH TARGET LDL LESS THAN 130: ICD-10-CM

## 2022-08-03 DIAGNOSIS — I25.110 CORONARY ARTERY DISEASE INVOLVING NATIVE CORONARY ARTERY OF NATIVE HEART WITH UNSTABLE ANGINA PECTORIS (H): Primary | ICD-10-CM

## 2022-08-03 LAB
ANION GAP SERPL CALCULATED.3IONS-SCNC: 4 MMOL/L (ref 3–14)
BUN SERPL-MCNC: 14 MG/DL (ref 7–30)
CALCIUM SERPL-MCNC: 8.9 MG/DL (ref 8.5–10.1)
CHLORIDE BLD-SCNC: 108 MMOL/L (ref 94–109)
CHOLEST SERPL-MCNC: 137 MG/DL
CO2 SERPL-SCNC: 30 MMOL/L (ref 20–32)
CREAT SERPL-MCNC: 0.86 MG/DL (ref 0.66–1.25)
FASTING STATUS PATIENT QL REPORTED: YES
GFR SERPL CREATININE-BSD FRML MDRD: >90 ML/MIN/1.73M2
GLUCOSE BLD-MCNC: 92 MG/DL (ref 70–99)
HDLC SERPL-MCNC: 57 MG/DL
LDLC SERPL CALC-MCNC: 68 MG/DL
NONHDLC SERPL-MCNC: 80 MG/DL
POTASSIUM BLD-SCNC: 4.2 MMOL/L (ref 3.4–5.3)
SODIUM SERPL-SCNC: 142 MMOL/L (ref 133–144)
TRIGL SERPL-MCNC: 58 MG/DL

## 2022-08-03 PROCEDURE — 36415 COLL VENOUS BLD VENIPUNCTURE: CPT

## 2022-08-03 PROCEDURE — 99215 OFFICE O/P EST HI 40 MIN: CPT | Performed by: STUDENT IN AN ORGANIZED HEALTH CARE EDUCATION/TRAINING PROGRAM

## 2022-08-03 PROCEDURE — 80048 BASIC METABOLIC PNL TOTAL CA: CPT

## 2022-08-03 PROCEDURE — 80061 LIPID PANEL: CPT

## 2022-08-03 RX ORDER — ATORVASTATIN CALCIUM 80 MG/1
80 TABLET, FILM COATED ORAL DAILY
Qty: 90 TABLET | Refills: 0 | Status: SHIPPED | OUTPATIENT
Start: 2022-08-03 | End: 2022-10-26

## 2022-08-03 NOTE — PATIENT INSTRUCTIONS
You were seen at the [facility] Cardiology clinic today.   You saw Dr. Angel Bansal, [facility], [place].    The following is a summary of your office visit today:    No changes to medications   No tests required  Please try and pursue moderate-intensity exercise for 30 minutes at least five times weekly.  Please try and maintain a diet rich in fruit and vegetables and low in saturated fats and sugars.  Follow-up with me 12 months     Nurse contact information:    Cardiology Care Coordinators: Amira Rice RN   Phone: [phone]   Fax: [phone]      HOW TO CHECK YOUR BLOOD PRESSURE AT HOME:     Avoid eating, smoking, and exercising for at least 30 minutes before taking a reading.    Be sure you have taken your BP medication at least 2-3 hours before you check it.     Sit quietly for 10 minutes before a reading.     Sit in a chair with your feet flat on the floor. Rest your  arm on a table so that the arm cuff is at the same level as your heart.    Remain still during the reading.    Record your blood pressure and pulse in a log and bring to your next appointment.     If you have had any blood work, imaging or other testing completed we will be in touch within 1-2 weeks regarding the results. If you have any questions, concerns or need to schedule a follow up, please contact us at [phone]. If you are needing refills please contact your pharmacy. For urgent after hour care please call the [facility] at [phone] or the [facility] at [phone] and ask to speak to the on-call Cardiologist.    It was a pleasure meeting with you today. Please let us know if there is anything else we can do for you so that we can be sure you are leaving completely satisfied with your care experience.     Your Cardiology Team at [facility]  RN Care Coordinator: Amira BALDWIN: Hosea

## 2022-08-03 NOTE — TELEPHONE ENCOUNTER
Prescription approved per [facility] Refill Protocol.    Melita Crandall RN BSN  [facility]

## 2022-08-03 NOTE — PROGRESS NOTES
Chao Quezada's goals for this visit include:     He requests these members of his care team be copied on today's visit information: PCP    PCP: Sony Combs    Referring Provider:  Angel Bansal MD  [address] [place] [id number]    /80 (BP Location: Left arm, Patient Position: Sitting, Cuff Size: Adult Regular)   Pulse (!) 48   Wt 88 kg (194 lb 1.6 oz)   SpO2 98%   BMI 26.58 kg/m      Do you need any medication refills at today's visit? No.    Jairo Valdez, EMT  Clinic Support  [facility]    [phone]    Employed by [facility] [employer]

## 2022-10-09 ENCOUNTER — HEALTH MAINTENANCE LETTER (OUTPATIENT)
Age: 67
End: 2022-10-09

## 2023-02-01 ENCOUNTER — OFFICE VISIT (OUTPATIENT)
Dept: FAMILY MEDICINE | Facility: CLINIC | Age: 68
End: 2023-02-01
Payer: COMMERCIAL

## 2023-02-01 ENCOUNTER — ANCILLARY PROCEDURE (OUTPATIENT)
Dept: GENERAL RADIOLOGY | Facility: CLINIC | Age: 68
End: 2023-02-01
Attending: PHYSICIAN ASSISTANT
Payer: COMMERCIAL

## 2023-02-01 VITALS
OXYGEN SATURATION: 96 % | HEART RATE: 62 BPM | SYSTOLIC BLOOD PRESSURE: 122 MMHG | DIASTOLIC BLOOD PRESSURE: 76 MMHG | HEIGHT: 70 IN | WEIGHT: 200.4 LBS | RESPIRATION RATE: 20 BRPM | TEMPERATURE: 98 F | BODY MASS INDEX: 28.69 KG/M2

## 2023-02-01 DIAGNOSIS — M79.2 RADICULAR PAIN OF LEFT UPPER EXTREMITY: ICD-10-CM

## 2023-02-01 DIAGNOSIS — Z12.5 SCREENING FOR PROSTATE CANCER: ICD-10-CM

## 2023-02-01 DIAGNOSIS — Z12.11 COLON CANCER SCREENING: ICD-10-CM

## 2023-02-01 DIAGNOSIS — Z00.00 ROUTINE GENERAL MEDICAL EXAMINATION AT A HEALTH CARE FACILITY: Primary | ICD-10-CM

## 2023-02-01 DIAGNOSIS — I25.110 CORONARY ARTERY DISEASE INVOLVING NATIVE CORONARY ARTERY OF NATIVE HEART WITH UNSTABLE ANGINA PECTORIS (H): ICD-10-CM

## 2023-02-01 PROCEDURE — 36415 COLL VENOUS BLD VENIPUNCTURE: CPT | Performed by: PHYSICIAN ASSISTANT

## 2023-02-01 PROCEDURE — G0438 PPPS, INITIAL VISIT: HCPCS | Performed by: PHYSICIAN ASSISTANT

## 2023-02-01 PROCEDURE — 99213 OFFICE O/P EST LOW 20 MIN: CPT | Mod: 25 | Performed by: PHYSICIAN ASSISTANT

## 2023-02-01 PROCEDURE — 72040 X-RAY EXAM NECK SPINE 2-3 VW: CPT | Mod: TC | Performed by: RADIOLOGY

## 2023-02-01 PROCEDURE — G0103 PSA SCREENING: HCPCS | Performed by: PHYSICIAN ASSISTANT

## 2023-02-01 RX ORDER — NITROGLYCERIN 0.4 MG/1
TABLET SUBLINGUAL
Qty: 25 TABLET | Refills: 0 | Status: SHIPPED | OUTPATIENT
Start: 2023-02-01

## 2023-02-01 ASSESSMENT — ENCOUNTER SYMPTOMS
FREQUENCY: 0
NAUSEA: 0
DIZZINESS: 0
DYSURIA: 0
JOINT SWELLING: 0
SORE THROAT: 0
PARESTHESIAS: 0
WEAKNESS: 0
COUGH: 0
CONSTIPATION: 0
CHILLS: 0
HEARTBURN: 0
SHORTNESS OF BREATH: 0
NERVOUS/ANXIOUS: 0
HEMATURIA: 0
DIARRHEA: 0
EYE PAIN: 0
HEADACHES: 0
HEMATOCHEZIA: 0
ARTHRALGIAS: 1
PALPITATIONS: 0
MYALGIAS: 1
FEVER: 0
ABDOMINAL PAIN: 0

## 2023-02-01 ASSESSMENT — ACTIVITIES OF DAILY LIVING (ADL): CURRENT_FUNCTION: NO ASSISTANCE NEEDED

## 2023-02-01 ASSESSMENT — PAIN SCALES - GENERAL: PAINLEVEL: NO PAIN (0)

## 2023-02-01 NOTE — PATIENT INSTRUCTIONS
Preventive Health Recommendations:     See your health care provider every year to    Review health changes.     Discuss preventive care.      Review your medicines if your doctor has prescribed any.      Talk with your health care provider about whether you should have a test to screen for prostate cancer (PSA).    Every 3 years, have a diabetes test (fasting glucose). If you are at risk for diabetes, you should have this test more often.    Every 5 years, have a cholesterol test. Have this test more often if you are at risk for high cholesterol or heart disease.     Every 10 years, have a colonoscopy. Or, have a yearly FIT test (stool test). These exams will check for colon cancer.    Talk to with your health care provider about screening for Abdominal Aortic Aneurysm if you have a family history of AAA or have a history of smoking.    Shots:     Get a flu shot each year.     Get a tetanus shot every 10 years.     Talk to your doctor about your pneumonia vaccines. There are now two you should receive - Pneumovax (PPSV 23) and Prevnar (PCV 13).     Talk to your pharmacist about a shingles vaccine.     Talk to your doctor about the hepatitis B vaccine.  Nutrition:     Eat at least 5 servings of fruits and vegetables each day.     Eat whole-grain bread, whole-wheat pasta and brown rice instead of white grains and rice.     Get adequate Calcium and Vitamin D.   Lifestyle    Exercise for at least 150 minutes a week (30 minutes a day, 5 days a week). This will help you control your weight and prevent disease.     Limit alcohol to one drink per day.     No smoking.     Wear sunscreen to prevent skin cancer.    See your dentist every six months for an exam and cleaning.    See your eye doctor every 1 to 2 years to screen for conditions such as glaucoma, macular degeneration, cataracts, etc.    Personalized Prevention Plan  You are due for the preventive services outlined below.  Your care team is available to assist you  in scheduling these services.  If you have already completed any of these items, please share that information with your care team to update in your medical record.  Health Maintenance Due   Topic Date Due     Pneumococcal Vaccine (1 - PCV) Never done     Zoster (Shingles) Vaccine (1 of 2) Never done     AORTIC ANEURYSM SCREENING (SYSTEM ASSIGNED)  Never done     Annual Wellness Visit  11/25/2021     FALL RISK ASSESSMENT  11/25/2021     ANNUAL REVIEW OF HM ORDERS  06/16/2022     Diptheria Tetanus Pertussis (DTAP/TDAP/TD) Vaccine (2 - Td or Tdap) 10/01/2022     PHQ-2 (once per calendar year)  01/01/2023

## 2023-02-01 NOTE — PROGRESS NOTES
"SUBJECTIVE:   Chao is a 67 year old who presents for Preventive Visit.  Patient has been advised of split billing requirements and indicates understanding: Yes  Are you in the first 12 months of your Medicare coverage?  No    Healthy Habits:     In general, how would you rate your overall health?  Good    Frequency of exercise:  2-3 days/week    Duration of exercise:  15-30 minutes    Do you usually eat at least 4 servings of fruit and vegetables a day, include whole grains    & fiber and avoid regularly eating high fat or \"junk\" foods?  Yes    Taking medications regularly:  Yes    Medication side effects:  None    Ability to successfully perform activities of daily living:  No assistance needed    Home Safety:  No safety concerns identified    Hearing Impairment:  Difficulty following a conversation in a noisy restaurant or crowded room, difficult to understand a speaker at a public meeting or [employer] service and difficulty understanding soft or whispered speech    In the past 6 months, have you been bothered by leaking of urine?  No    In general, how would you rate your overall mental or emotional health?  Good      PHQ-2 Total Score: 0    Additional concerns today:  No    Left lateral arm pain . Typically at rest. Not exertional . More of a sharp burning pain. Radicular in nature. No neck pain. No paresthesias. No arm weakness.   Have you ever done Advance Care Planning? (For example, a Health Directive, POLST, or a discussion with a medical provider or your loved ones about your wishes): No, advance care planning information given to patient to review.  Advanced care planning was discussed at today's visit.         Cognitive Screening - patient declined    Do you have sleep apnea, excessive snoring or daytime drowsiness?: no    Reviewed and updated as needed this visit by clinical staff   Tobacco  Allergies  Meds              Reviewed and updated as needed this visit by Provider                 Social " History     Tobacco Use     Smoking status: Never     Smokeless tobacco: Never     Tobacco comments:     never smoked   Substance Use Topics     Alcohol use: Yes     Comment: rare         Alcohol Use 2/1/2023   Prescreen: >3 drinks/day or >7 drinks/week? No   Prescreen: >3 drinks/day or >7 drinks/week? -         Current providers sharing in care for this patient include:   Patient Care Team:  Sony Combs PA-C as PCP - General (Physician Assistant)  Angel Bansal MD as Assigned Heart and Vascular Provider  Denis Radford DO as Assigned PCP    The following health maintenance items are reviewed in Epic and correct as of today:  Health Maintenance   Topic Date Due     Pneumococcal Vaccine: 65+ Years (1 - PCV) Never done     ZOSTER IMMUNIZATION (1 of 2) Never done     AORTIC ANEURYSM SCREENING (SYSTEM ASSIGNED)  Never done     MEDICARE ANNUAL WELLNESS VISIT  11/25/2021     INFLUENZA VACCINE (1) 09/01/2022     DTAP/TDAP/TD IMMUNIZATION (2 - Td or Tdap) 10/01/2022     LIPID  08/03/2023     ANNUAL REVIEW OF HM ORDERS  02/01/2024     FALL RISK ASSESSMENT  02/01/2024     COLORECTAL CANCER SCREENING  11/30/2026     ADVANCE CARE PLANNING  02/01/2028     HEPATITIS C SCREENING  Completed     PHQ-2 (once per calendar year)  Completed     COVID-19 Vaccine  Completed     IPV IMMUNIZATION  Aged Out     MENINGITIS IMMUNIZATION  Aged Out     Lab work is in process  Labs reviewed in EPIC  BP Readings from Last 3 Encounters:   02/01/23 122/76   08/03/22 132/80   12/08/21 125/79    Wt Readings from Last 3 Encounters:   02/01/23 90.9 kg (200 lb 6.4 oz)   08/03/22 88 kg (194 lb 1.6 oz)   12/08/21 87.3 kg (192 lb 8 oz)                  Patient Active Problem List   Diagnosis     Hyperlipidemia with target LDL less than 130     Coronary artery disease involving native coronary artery of native heart with unstable angina pectoris (H)     Chest pain, unspecified type     Status post coronary angiogram     Past Surgical History:    Procedure Laterality Date     COLONOSCOPY WITH CO2 INSUFFLATION N/A 11/30/2016    Procedure: COLONOSCOPY WITH CO2 INSUFFLATION;  Surgeon: Duane, William Charles, MD;  Location: [facility] OR     CV CORONARY ANGIOGRAM N/A 7/23/2021    Procedure: CV CORONARY ANGIOGRAM;  Surgeon: Kale Singh MD;  Location:  [facility]     CV PCI ANGIOPLASTY N/A 7/23/2021    Procedure: Percutaneous Transluminal Angioplasty;  Surgeon: Kale Singh MD;  Location:  [facility]     CV PCI STENT DRUG ELUTING N/A 7/23/2021    Procedure: Percutaneous Coronary Intervention Stent Drug Eluting;  Surgeon: Kale Singh MD;  Location:  [facility]     HC EXPLORATION ANKLE JOINT  1994    right ankle fracture with pins and screws     ZZC ANESTH,KNEE JOINT; NOS  1973    right knee, with hx of dislocaton of knee cap     ZZHC COLONOSCOPY W/WO BRUSH/WASH  1/24/2005        Social History     Tobacco Use     Smoking status: Never     Smokeless tobacco: Never     Tobacco comments:     never smoked   Substance Use Topics     Alcohol use: Yes     Comment: rare     Family History   Problem Relation Age of Onset     Cancer Father         throat, prostate     Prostate Cancer Father      Diabetes Mother      Cancer Brother          Current Outpatient Medications   Medication Sig Dispense Refill     aspirin (ASA) 81 MG EC tablet Take 1 tablet (81 mg) by mouth daily 90 tablet 1     atorvastatin (LIPITOR) 80 MG tablet TAKE 1 TABLET(80 MG) BY MOUTH DAILY 90 tablet 0     losartan (COZAAR) 25 MG tablet Take 1 tablet (25 mg) by mouth daily 90 tablet 3     nitroGLYcerin (NITROSTAT) 0.4 MG sublingual tablet For chest pain place 1 tablet under the tongue every 5 minutes for 3 doses. If symptoms persist 5 minutes after 1st dose call 911. 25 tablet 0     Allergies   Allergen Reactions     No Known Drug Allergies      Recent Labs   Lab Test 08/03/22  0739 10/08/21  1101 07/23/21  1036 06/16/21  1022 11/25/20  1218 01/23/18  1511  "10/27/16  0955 09/04/15  1022 04/21/15  0755   LDL 68 55  --  164*   < > 108* 115*   < > 159*   HDL 57 49  --  52   < > 61 54   < > 54   TRIG 58 43  --  49   < > 42 59   < > 72   ALT  --   --   --   --   --   --  23  --  25   CR 0.86  --  1.02  --   --  0.97 0.93  --  0.92   GFRESTIMATED >90  --  76  --   --  78 82  --  83   GFRESTBLACK  --   --   --   --   --  >90 >90  African American GFR Calc    --  >90   GFR Calc     POTASSIUM 4.2  --  4.2  --   --  4.1 4.2  --  4.3    < > = values in this interval not displayed.              Review of Systems   Constitutional: Negative for chills and fever.   HENT: Positive for hearing loss. Negative for congestion, ear pain and sore throat.    Eyes: Negative for pain and visual disturbance.   Respiratory: Negative for cough and shortness of breath.    Cardiovascular: Positive for chest pain. Negative for palpitations and peripheral edema.   Gastrointestinal: Negative for abdominal pain, constipation, diarrhea, heartburn, hematochezia and nausea.   Genitourinary: Negative for dysuria, frequency, genital sores, hematuria, impotence, penile discharge and urgency.   Musculoskeletal: Positive for arthralgias and myalgias. Negative for joint swelling.   Skin: Negative for rash.   Neurological: Negative for dizziness, weakness, headaches and paresthesias.   Psychiatric/Behavioral: Negative for mood changes. The patient is not nervous/anxious.      Constitutional, HEENT, cardiovascular, pulmonary, GI, , musculoskeletal, neuro, skin, endocrine and psych systems are negative, except as otherwise noted.    OBJECTIVE:   /76   Pulse 62   Temp 98  F (36.7  C) (Tympanic)   Resp 20   Ht 1.778 m (5' 10\")   Wt 90.9 kg (200 lb 6.4 oz)   SpO2 96%   BMI 28.75 kg/m   Estimated body mass index is 28.75 kg/m  as calculated from the following:    Height as of this encounter: 1.778 m (5' 10\").    Weight as of this encounter: 90.9 kg (200 lb 6.4 oz).  Physical " Exam  GENERAL: healthy, alert and no distress  EYES: Eyes grossly normal to inspection, PERRL and conjunctivae and sclerae normal  HENT: ear canals and TM's normal, nose and mouth without ulcers or lesions  NECK: no adenopathy, no asymmetry, masses, or scars and thyroid normal to palpation  RESP: lungs clear to auscultation - no rales, rhonchi or wheezes  CV: regular rate and rhythm, normal S1 S2, no S3 or S4, no murmur, click or rub, no peripheral edema and peripheral pulses strong  ABDOMEN: soft, nontender, no hepatosplenomegaly, no masses and bowel sounds normal  MS: no gross musculoskeletal defects noted, no edema  SKIN: no suspicious lesions or rashes  NEURO: Normal strength and tone, mentation intact and speech normal  PSYCH: mentation appears normal, affect normal/bright  Neck rom limited in general. Negative spurlings test. Shoulder exam - both sides normal; full range of motion, no pain on motion, no tenderness or deformity noted.  No tenderness with palpation of his lateral upper arm.    Chao was seen today for wellness visit and health maintenance.    Diagnoses and all orders for this visit:    Routine general medical examination at a health care facility  -     Honoring Choices Referral; Future    Coronary artery disease involving native coronary artery of native heart with unstable angina pectoris (H)  -     nitroGLYcerin (NITROSTAT) 0.4 MG sublingual tablet; For chest pain place 1 tablet under the tongue every 5 minutes for 3 doses. If symptoms persist 5 minutes after 1st dose call 911.    Radicular pain of left upper extremity  -     XR Cervical Spine 2/3 Views; Future    Screening for prostate cancer  -     PSA, screen; Future  -     PSA, screen    Colon cancer screening  -     Colonoscopy Screening  Referral; Future    Other orders  -     REVIEW OF HEALTH MAINTENANCE PROTOCOL ORDERS          Diagnostic Test Results:  Labs reviewed in Epic    ASSESSMENT / PLAN:       ICD-10-CM    1. Routine  general medical examination at a health care facility  Z00.00 Honoring Choices Referral      2. Coronary artery disease involving native coronary artery of native heart with unstable angina pectoris (H)  I25.110 nitroGLYcerin (NITROSTAT) 0.4 MG sublingual tablet      3. Radicular pain of left upper extremity  M79.2 XR Cervical Spine 2/3 Views      4. Screening for prostate cancer  Z12.5 PSA, screen     PSA, screen      5. Colon cancer screening  Z12.11 Colonoscopy Screening  Referral      nsaids. Consider physical therapy.      Patient has been advised of split billing requirements and indicates understanding: Yes      COUNSELING:  Reviewed preventive health counseling, as reflected in patient instructions       Regular exercise       Healthy diet/nutrition        He reports that he has never smoked. He has never used smokeless tobacco.      Appropriate preventive services were discussed with this patient, including applicable screening as appropriate for cardiovascular disease, diabetes, osteopenia/osteoporosis, and glaucoma.  As appropriate for age/gender, discussed screening for colorectal cancer, prostate cancer, breast cancer, and cervical cancer. Checklist reviewing preventive services available has been given to the patient.    Reviewed patients plan of care and provided an AVS. The Basic Care Plan (routine screening as documented in Health Maintenance) for Chao meets the Care Plan requirement. This Care Plan has been established and reviewed with the Patient.          Sony Combs PA-C  [facility]    Identified Health Risks:

## 2023-02-02 LAB — PSA SERPL-MCNC: 1.25 UG/L (ref 0–4)

## 2023-03-20 ENCOUNTER — TELEPHONE (OUTPATIENT)
Dept: GASTROENTEROLOGY | Facility: CLINIC | Age: 68
End: 2023-03-20
Payer: COMMERCIAL

## 2023-03-20 NOTE — TELEPHONE ENCOUNTER
Screening Questions  BLUE  KIND OF PREP RED  LOCATION [review exclusion criteria] GREEN  SEDATION TYPE        Y Are you active on mychart?       MAURILIO OJEDA Ordering/Referring Provider?        AETNA MEDICAREWhat type of coverage do you have?      N Have you had a positive covid test in the last 14 days?     28.7 1. BMI  [BMI 40+ - review exclusion criteria]    Y  2. Are you able to give consent for your medical care? [IF NO,RN REVIEW]          N  3. Are you taking any prescription pain medications on a routine schedule   (ex narcotics: oxycodone, roxicodone, oxycontin,  and percocet)? [RN Review]          3a. EXTENDED PREP What kind of prescription?     N 4. Do you have any chemical dependencies such as alcohol, street drugs, or methadone?        **If yes 3- 5 , please schedule with MAC sedation.**          IF YES TO ANY 6 - 10 - HOSPITAL SETTING ONLY.     N 6.   Do you need assistance transferring?     N 7.   Have you had a heart or lung transplant?    N 8.   Are you currently on dialysis?   N 9.   Do you use daily home oxygen?   NO PER PT 10. Do you take nitroglycerin?   10a.  If yes, how often?     11. [FEMALES]   Are you currently pregnant?    11a.  If yes, how many weeks? [ Greater than 12 weeks, OR NEEDED]    N 12. Do you have Pulmonary Hypertension? *NEED PAC APPT AT [facility] w/ [facility]*     N 13. [review exclusion criteria]  Do you have any implantable devices in your body (pacemaker, defib, LVAD)?    N 14. In the past 6 months, have you had any heart related issues including cardiomyopathy or heart attack?     14a.  If yes, did it require cardiac stenting if so when?     N 15. Have you had a stroke or Transient ischemic attack (TIA - aka  mini stroke ) within 6 months?      N 16. Do you have mod to severe Obstructive Sleep Apnea?  [Hospital only]    N 17. Do you have SEVERE AND UNCONTROLLED asthma? *NEED PAC APPT AT [facility] w/MAC*     18. Are you currently taking any blood thinners?     18a. No. Continue to  "19.   18b.    N 19. Do you take the medication Phentermine?    19a. If yes, \"Hold for 7 days before procedure.  Please consult your prescribing provider if you have questions about holding this medication.\"     N  20. Do you have chronic kidney disease?      N  21. Do you have a diagnosis of diabetes?     N  22. On a regular basis do you go 3-5 days between bowel movements?      23. Preferred LOCAL Pharmacy for Pre Prescription    [ LIST ONLY ONE PHARMACY]     [id number] - [place], [place] - [address] [address]        - CLOSING REMINDERS -    Informed patient they will need an adult    Cannot take any type of public or medical transportation alone    Conscious Sedation- Needs  for 6 hours after the procedure       MAC/General-Needs  for 24 hours after procedure    Pre-Procedure Covid test to be completed [[facility] PCR Testing Required]    Confirmed Nurse will call to complete assessment       - SCHEDULING DETAILS -  N Hospital Setting Required? If yes, what is the exclusion?:    PABLO  Surgeon    5/9  Date of Procedure  Lower Endoscopy [Colonoscopy]  Type of Procedure Scheduled  [facility]-If you answer yes to questions #8, #20, #21Which Colonoscopy Prep was Sent?     CS  Sedation Type     N PAC / Pre-op Required                 "

## 2023-04-24 ENCOUNTER — TELEPHONE (OUTPATIENT)
Dept: GASTROENTEROLOGY | Facility: CLINIC | Age: 68
End: 2023-04-24

## 2023-04-24 DIAGNOSIS — Z12.11 COLON CANCER SCREENING: Primary | ICD-10-CM

## 2023-04-24 RX ORDER — BISACODYL 5 MG/1
TABLET, DELAYED RELEASE ORAL
Qty: 4 TABLET | Refills: 0 | Status: SHIPPED | OUTPATIENT
Start: 2023-04-24 | End: 2023-05-09

## 2023-04-24 NOTE — TELEPHONE ENCOUNTER
Pre assessment questions completed for upcoming Colonoscopy  procedure scheduled on 05.09.2023    COVID policy reviewed.     Pre-op exam? N/A    Reviewed procedural arrival time 0900, procedure time 0945 and facility location [facility]; [address], [place] [address]    Designated  policy reviewed. Instructed to have someone stay 6 hours post procedure.     Anticoagulation/blood thinners? No    Electronic implanted devices? No    Diabetic? No    Procedure indication: Colon cancer screening    Bowel prep recommendation: Standard Golytely     Reviewed procedure prep instructions.     Prep instructions sent via [employer].  Bowel prep script sent to [facility] #[id number] - [place], [place] - [address]     Patient verbalized understanding and had no questions or concerns at this time.    Elidia Liu RN  Endoscopy Procedure Pre Assessment RN

## 2023-05-09 ENCOUNTER — APPOINTMENT (OUTPATIENT)
Dept: LAB | Facility: CLINIC | Age: 68
End: 2023-05-09
Payer: COMMERCIAL

## 2023-05-09 ENCOUNTER — HOSPITAL ENCOUNTER (OUTPATIENT)
Facility: AMBULATORY SURGERY CENTER | Age: 68
Discharge: HOME | End: 2023-05-09
Attending: SPECIALIST | Admitting: SPECIALIST
Payer: COMMERCIAL

## 2023-05-09 VITALS
TEMPERATURE: 97.5 F | RESPIRATION RATE: 16 BRPM | HEART RATE: 57 BPM | SYSTOLIC BLOOD PRESSURE: 132 MMHG | OXYGEN SATURATION: 95 % | DIASTOLIC BLOOD PRESSURE: 86 MMHG

## 2023-05-09 LAB — COLONOSCOPY: NORMAL

## 2023-05-09 PROCEDURE — 88305 TISSUE EXAM BY PATHOLOGIST: CPT | Mod: TC | Performed by: SPECIALIST

## 2023-05-09 PROCEDURE — G8907 PT DOC NO EVENTS ON DISCHARG: HCPCS

## 2023-05-09 PROCEDURE — 88305 TISSUE EXAM BY PATHOLOGIST: CPT | Mod: 26 | Performed by: STUDENT IN AN ORGANIZED HEALTH CARE EDUCATION/TRAINING PROGRAM

## 2023-05-09 PROCEDURE — 45381 COLONOSCOPY SUBMUCOUS NJX: CPT

## 2023-05-09 PROCEDURE — G8918 PT W/O PREOP ORDER IV AB PRO: HCPCS

## 2023-05-09 PROCEDURE — 45385 COLONOSCOPY W/LESION REMOVAL: CPT

## 2023-05-09 RX ORDER — ONDANSETRON 2 MG/ML
4 INJECTION INTRAMUSCULAR; INTRAVENOUS
Status: DISCONTINUED | OUTPATIENT
Start: 2023-05-09 | End: 2023-05-10 | Stop reason: HOSPADM

## 2023-05-09 RX ORDER — LIDOCAINE 40 MG/G
CREAM TOPICAL
Status: DISCONTINUED | OUTPATIENT
Start: 2023-05-09 | End: 2023-05-10 | Stop reason: HOSPADM

## 2023-05-09 RX ORDER — FENTANYL CITRATE 50 UG/ML
INJECTION, SOLUTION INTRAMUSCULAR; INTRAVENOUS PRN
Status: DISCONTINUED | OUTPATIENT
Start: 2023-05-09 | End: 2023-05-09 | Stop reason: HOSPADM

## 2023-05-11 LAB
PATH REPORT.COMMENTS IMP SPEC: NORMAL
PATH REPORT.COMMENTS IMP SPEC: NORMAL
PATH REPORT.FINAL DX SPEC: NORMAL
PATH REPORT.GROSS SPEC: NORMAL
PATH REPORT.MICROSCOPIC SPEC OTHER STN: NORMAL
PATH REPORT.RELEVANT HX SPEC: NORMAL
PHOTO IMAGE: NORMAL

## 2023-08-15 DIAGNOSIS — I10 PRIMARY HYPERTENSION: ICD-10-CM

## 2023-08-15 DIAGNOSIS — I25.110 CORONARY ARTERY DISEASE INVOLVING NATIVE CORONARY ARTERY OF NATIVE HEART WITH UNSTABLE ANGINA PECTORIS (H): ICD-10-CM

## 2023-08-22 RX ORDER — LOSARTAN POTASSIUM 25 MG/1
25 TABLET ORAL DAILY
Qty: 90 TABLET | Refills: 0 | Status: SHIPPED | OUTPATIENT
Start: 2023-08-22 | End: 2023-11-29

## 2023-08-22 NOTE — TELEPHONE ENCOUNTER
8/3/2022  [facility] Angel Burciaga MD  Cardiovascular Disease   Next visit  9/13/23. Labs due( last 8/5/22). 90 day sent.

## 2023-09-12 NOTE — PROGRESS NOTES
Chief Complaint: Chest pain     HPI: Chao Quezada is a 65 year old male with a past medical history of dyslpidemia who was referred to me for evaluation of chest pain by Mr. Sony Combs. He came to the appointment with his wife today.     At baseline, Mr. Quezada is a .  His work involves walking up dozens of flights of stairs a day and spending most of the day on his feet.  He notes that he was able to perform this work in an unrestricted fashion until approximately 3 months ago.  At this time, he noted that he had onset of central chest tightness when he was walking.  The chest tightness has continued to recur with activity and, in fact, become more frequent, further limiting his activity tolerance.  He notes that in the last month he has only been able to walk one flight of stairs before he has the onset of the chest tightness.  He describes this as being central but, more recently, also radiating to his left arm.  He has been able to relieve self of the chest tightness by resting.  Briefly, Mr. Quezada saw Julieta Lauryn for evaluation of this chest tightness last month.  At the time, he was given as needed nitroglycerin tablets, started on atorvastatin, and referred for an exercise stress echo.  Mr. Quezada only achieved 69% of his target heart rate but he developed wall motion abnormalities in the LAD territory.  At the stage, he was referred to cardiology for further evaluation.  Of note, Mr. Quezada notes that he has had some relief with the nitroglycerin tablets, but he does sometimes have headache with them.    At the time of the consultation, he notes an absence of chest pain at rest, dyspnea at rest or with exertion, PND, orthopnea, peripheral edema, palpitations, hemoptysis, fever, cough, lightheadedness or syncope.     A comprehensive ROS was done and the details are included above in the HPI.    Interval History 08/04/2021:  Mr. Quezada underwent coronary angiography and PCI to the LAD and  LCx on 07/23/21. Since then, he has not had a single episode of angina. He has been walking his dog and playing with his three grandchildren regularly and also pursuing formal cardiac rehab. No sequelae of bleeding. He has had multiple episodes of headache with ISMN and, while this has improved somewhat, this continues on a daily basis.     A comprehensive ROS was done and was otherwise negative.     Interval History 12/06/2021:  Since his last visit, Mr. Quezada has noted a complete cessation of his angina and headache.  He has returned full-time to work and has been able to take all the steps.  He had one episode of bleeding secondary to trauma from a bird bite while he was moving a bird feeder.  Other than that he has had no sequelae of bleeding.  Mr. Quezada notes that he had a dry cough and nightmares after starting lisinopril.    A comprehensive ROS was done and the details are included above in the HPI.    Interval History 8/3/22:    Since Mr. Quezada's last visit, he has completed 12 months of ticagrelor. He is feeling quite well.  He notes that he has occasional episodes of nonanginal chest pain plan he is stressed out or upset.  He describes pain as being different in nature from the anginal pain that he had prior to his PCI.  It lasts for few minutes at a time, has a left hemithoracic location, and is self resolving after few minutes.    Otherwise, Mr. Quezada continues to work full-time.  He is planning to move to [place] in the next year.    A comprehensive ROS was done and the details are included above in the HPI.    Interval History 9/13/23:    Since Mr. Quezada's last visit, he has decided to move to [place].  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    From medical standpoint, he notes that he has had some left prepectoral pain.  Does not relate with exertion is reproducible by palpation over the area.  In fact, he has tried walking briskly to see if it worsens the pain, without  any change in the pain.    A comprehensive ROS was done and the details are included above in the HPI.    Past Medical History:  - Coronary artery disease with PCI to LAD and LCx for ACS (unstable angina) on 07/23/2021  - Hypertension  - Dyslipidemia, started on statin in 06/2021   - No prior history of T2DM, TIA/stroke, vascular aneurysms, PAD  Past Medical History:   Diagnosis Date    Backache, unspecified     Dermatophytosis of nail 1998    Headache        Past Surgical History:    Past Surgical History:   Procedure Laterality Date    COLONOSCOPY N/A 5/9/2023    Procedure: COLONOSCOPY, FLEXIBLE, WITH LESION REMOVAL USING SNARE;  Surgeon: Ruperto Tipton MD;  Location: [facility] OR    COLONOSCOPY WITH CO2 INSUFFLATION N/A 11/30/2016    Procedure: COLONOSCOPY WITH CO2 INSUFFLATION;  Surgeon: Duane, William Charles, MD;  Location: [facility] OR    COLONOSCOPY WITH CO2 INSUFFLATION N/A 5/9/2023    Procedure: Colonoscopy with CO2 insufflation;  Surgeon: Ruperto Tipton MD;  Location: [facility] OR    CV CORONARY ANGIOGRAM N/A 7/23/2021    Procedure: CV CORONARY ANGIOGRAM;  Surgeon: Kale Singh MD;  Location: [facility] CARDIAC CATH LAB    CV PCI ANGIOPLASTY N/A 7/23/2021    Procedure: Percutaneous Transluminal Angioplasty;  Surgeon: Kale Singh MD;  Location: [facility] CARDIAC CATH LAB    CV PCI STENT DRUG ELUTING N/A 7/23/2021    Procedure: Percutaneous Coronary Intervention Stent Drug Eluting;  Surgeon: Kale Singh MD;  Location:  [facility] CARDIAC CATH LAB    HC EXPLORATION ANKLE JOINT  1994    right ankle fracture with pins and screws    [facility] ANESTH,KNEE JOINT; NOS  1973    right knee, with hx of dislocaton of knee cap    [facility] COLONOSCOPY W/WO BRUSH/WASH  1/24/2005        Drug History:  Home cardiac meds: Aspirin 81 mg once daily, atorvastatin 80 mg once daily, losartan 25 mg once daily  Current Outpatient Medications   Medication Sig Dispense Refill    aspirin (ASA) 81 MG EC tablet Take 1 tablet (81 mg) by mouth daily 90  tablet 1    atorvastatin (LIPITOR) 80 MG tablet TAKE 1 TABLET(80 MG) BY MOUTH DAILY 90 tablet 1    losartan (COZAAR) 25 MG tablet Take 1 tablet (25 mg) by mouth daily 90 tablet 0    nitroGLYcerin (NITROSTAT) 0.4 MG sublingual tablet For chest pain place 1 tablet under the tongue every 5 minutes for 3 doses. If symptoms persist 5 minutes after 1st dose call 911. (Patient not taking: Reported on 9/13/2023) 25 tablet 0         Family History:  - No family history of sudden cardiac death, premature CAD, valvular disorders  Family History   Problem Relation Age of Onset    Cancer Father         throat, prostate    Prostate Cancer Father     Diabetes Mother     Cancer Brother        Social History:    Social History     Tobacco Use    Smoking status: Never    Smokeless tobacco: Never    Tobacco comments:     never smoked   Substance Use Topics    Alcohol use: Yes     Comment: rare       Allergies   Allergen Reactions    No Known Drug Allergy          Physical Examination:  Vitals: /74 (BP Location: Right arm, Patient Position: Sitting, Cuff Size: Adult Large)   Pulse (!) 45   Wt 89.8 kg (198 lb)   SpO2 99%   BMI 28.41 kg/m    BMI= Body mass index is 28.41 kg/m .    GENERAL: Healthy, alert and no distress  Eyes: No xanthelasmas.  ENT: Moist mucosal membranes.  RESPIRATORY: No signs of resp distress.  Pain with palpation of lateral left axillary area.  CTAB.  CARDIOVASCULAR:  No JVD.  Pulse regularly regular.  S1 plus S2 without any added heart sounds or murmurs.  ABDOMEN: ND, soft, non-tender, normal bowel sounds.  EXTREMITIES: Warm, well-perfused, no edema.  Some pain with left shoulder internal/external rotation.  NEUROLOGY: GCS 15/15, no focal deficits.  PSYCH: Cooperative, pleasant affect.       Investigations:  I personally viewed and interpreted the following investigations:    Labs:    CMP RESULTS:  Lab Results   Component Value Date     08/03/2022     01/23/2018     POTASSIUM 4.2 08/03/2022    POTASSIUM 4.1 01/23/2018    CHLORIDE 108 08/03/2022    CHLORIDE 105 01/23/2018    CO2 30 08/03/2022    CO2 26 01/23/2018    ANIONGAP 4 08/03/2022    ANIONGAP 9 01/23/2018    GLC 92 08/03/2022    GLC 74 01/23/2018    BUN 14 08/03/2022    BUN 14 01/23/2018    CR 0.86 08/03/2022    CR 0.97 01/23/2018    GFRESTIMATED >90 08/03/2022    GFRESTIMATED 78 01/23/2018    GFRESTBLACK >90 01/23/2018    NICKY 8.9 08/03/2022    NICKY 9.0 01/23/2018    BILITOTAL 0.5 10/27/2016    ALBUMIN 4.1 10/27/2016    ALKPHOS 65 10/27/2016    ALT 23 10/27/2016    AST 19 10/27/2016          LIPIDS:  Lab Results   Component Value Date    CHOL 137 08/03/2022    CHOL 203 06/16/2021     Lab Results   Component Value Date    HDL 57 08/03/2022    HDL 52 06/16/2021     Lab Results   Component Value Date    LDL 68 08/03/2022     06/16/2021     Lab Results   Component Value Date    TRIG 58 08/03/2022    TRIG 49 06/16/2021     Lab Results   Component Value Date    CHOLHDLRATIO 3.9 09/04/2015         Recent Tests:    ECG 9/13/2023:  Sinus bradycardia, ventricular rate of approximately 45 bpm.  No ischemic changes.    Echocardiogram (07/12/2021):     Abnormal exercise stress echocardiogram.  Test terminated at 69% MPHR due to chest pain and wall motion abnormality.  Normal resting images with EF 55-60%. No resting wall motion abnormalities.  With stress no increase in LV EF,patient had chest pain and LAD territory  akinesis.  No EKG evidence for ischemia.  Blunted BP response to exercise.  Normal functional capacity.  Normal baseline screening echocardiogram with no significant valvular  abnormalities. Mild mascending aortc dilation.    Coronary Angiogram 07/23/2021:    >>LM: No significant lesion  >>LAD: Diffusely diseased. Proximal LAD has 95% lesion followed by a long 50-60% mid LAD lesion. D1 ostium has a 40% lesion.  >>LCX: 95% lesion in the proximal segment.  >>RCA: 20% lesion in the proximal segment.  S/p successful PCI  of the proximal to mid LAD using 3 overlapping stents (Synergy JUSTINE 3.5*32 mm, 3.0*32 mm, 2.5*38 mm from proximal to distal) - Procedure details below  S/p successful PCI of the LCX using a Synergy JUSTINE 3.5*24 mm - Procedure details below    Assessment and Plan:   Chao Quezada is an extremely pleasant 67 year old with a past medical history of dyslipidemia who presented to me for evaluation of unstable angina in 2021.  He subsequently had revascularization of his LAD and left circumflex arteries.    Mr. Quezada continues to do well with his current medical regimen.  He is having nonanginal chest pain but does not describe any typical anginal symptoms.  In view of this and an excellent blood pressure (systolic less than 130 on average and diastolic less than 80 on average), I will continue his medical regimen as is.     I did inform Mr. Quezada that if his pain change in nature or frequency, that he could contact me and I would arrange an exercise stress echocardiogram.    Problems:  Coronary artery disease with PCI to LAD and LCx for ACS (unstable angina) on 07/23/2021, CCS 0   Dyslipidemia  Ascending aorta of 4.1 cm on TTE (indexed value of 1.9 cm/m2)  Hypertension, treated  Sinus bradycardia      Plan:  - Continue losartan 25 mg once daily  - Continue ASA 81 mg q24h lifelong   - Continue atorvastatin 80 mg q24h   - No further aortic surveillance needed given that the ascending aorta is normal in caliber when indexed to BSA  - RTC as needed, given his impending move to [place]      A total of 40 minutes were spent on the day of the visit for chart review, care coordination, face-to-face consultation with the patient, and documentation.           Angel Bansal [facility] [place]    Cardiovascular Division  Pager [phone]    CC  Patient Care Team:  Sony Combs PA-C as PCP - General (Physician Assistant)  Angel Bansal MD as Assigned Heart and Vascular Provider  Sony Combs PA-C as Assigned PCP  Sony Combs  BRISA Brown as Physician Assistant (Family Medicine)

## 2023-09-13 ENCOUNTER — OFFICE VISIT (OUTPATIENT)
Dept: CARDIOLOGY | Facility: CLINIC | Age: 68
End: 2023-09-13
Payer: COMMERCIAL

## 2023-09-13 VITALS
HEART RATE: 45 BPM | WEIGHT: 198 LBS | OXYGEN SATURATION: 99 % | BODY MASS INDEX: 28.41 KG/M2 | SYSTOLIC BLOOD PRESSURE: 129 MMHG | DIASTOLIC BLOOD PRESSURE: 74 MMHG

## 2023-09-13 DIAGNOSIS — I10 ESSENTIAL HYPERTENSION: ICD-10-CM

## 2023-09-13 DIAGNOSIS — I25.10 CORONARY ARTERY DISEASE INVOLVING NATIVE CORONARY ARTERY OF NATIVE HEART WITHOUT ANGINA PECTORIS: Primary | ICD-10-CM

## 2023-09-13 PROCEDURE — 93000 ELECTROCARDIOGRAM COMPLETE: CPT | Performed by: STUDENT IN AN ORGANIZED HEALTH CARE EDUCATION/TRAINING PROGRAM

## 2023-09-13 PROCEDURE — 99215 OFFICE O/P EST HI 40 MIN: CPT | Performed by: STUDENT IN AN ORGANIZED HEALTH CARE EDUCATION/TRAINING PROGRAM

## 2023-09-13 ASSESSMENT — PAIN SCALES - GENERAL: PAINLEVEL: NO PAIN (0)

## 2023-09-13 NOTE — PATIENT INSTRUCTIONS
Take your medicines every day, as directed     Changes made today:  No changes to medications  Please monitor your chest symptoms; if they get worse or change, give me a call and we can schedule a stress test (if you want)       Cardiology Care Coordinators:      Maritza JEFFRIES RN     Cardiology Rooming staff:  Jairo RAYMUNDO      Phone  [phone]      Fax [phone]    To Contact us     During Business Hours:  [phone]     If you are needing refills please contact your pharmacy.     For urgent after hour care please call the [facility] at [phone] or the [facility] at [phone] and ask to speak to the cardiologist on call.            HOW TO CHECK YOUR BLOOD PRESSURE AT HOME:     Avoid eating, smoking, and exercising for at least 30 minutes before taking a reading.     Be sure you have taken your BP medication at least 2-3 hours before you check it.      Sit quietly for 10 minutes before a reading.      Sit in a chair with your feet flat on the floor. Rest your  arm on a table so that the arm cuff is at the same level as your heart.     Remain still during the reading.  Record your blood pressure and pulse in a log and bring to your next appointment.       Use [facility] allows you to communicate directly with your heart team through secure messaging.  [facility] can be accessed any time on your phone, computer, or tablet.  If you need assistance, we'd be happy to help!             Keep your Heart Appointments:     Follow up with me as needed

## 2023-09-26 ENCOUNTER — OFFICE VISIT (OUTPATIENT)
Dept: FAMILY MEDICINE | Facility: CLINIC | Age: 68
End: 2023-09-26
Payer: COMMERCIAL

## 2023-09-26 ENCOUNTER — TELEPHONE (OUTPATIENT)
Dept: FAMILY MEDICINE | Facility: CLINIC | Age: 68
End: 2023-09-26

## 2023-09-26 VITALS
BODY MASS INDEX: 27.89 KG/M2 | SYSTOLIC BLOOD PRESSURE: 122 MMHG | WEIGHT: 199.2 LBS | HEIGHT: 71 IN | DIASTOLIC BLOOD PRESSURE: 86 MMHG | TEMPERATURE: 97.4 F | HEART RATE: 64 BPM | OXYGEN SATURATION: 98 % | RESPIRATION RATE: 16 BRPM

## 2023-09-26 DIAGNOSIS — M62.830 BACK MUSCLE SPASM: ICD-10-CM

## 2023-09-26 DIAGNOSIS — Z23 ENCOUNTER FOR VACCINATION: Primary | ICD-10-CM

## 2023-09-26 PROCEDURE — G0009 ADMIN PNEUMOCOCCAL VACCINE: HCPCS | Performed by: PHYSICIAN ASSISTANT

## 2023-09-26 PROCEDURE — G0008 ADMIN INFLUENZA VIRUS VAC: HCPCS | Performed by: PHYSICIAN ASSISTANT

## 2023-09-26 PROCEDURE — 90677 PCV20 VACCINE IM: CPT | Performed by: PHYSICIAN ASSISTANT

## 2023-09-26 PROCEDURE — 90662 IIV NO PRSV INCREASED AG IM: CPT | Performed by: PHYSICIAN ASSISTANT

## 2023-09-26 PROCEDURE — 99213 OFFICE O/P EST LOW 20 MIN: CPT | Mod: 25 | Performed by: PHYSICIAN ASSISTANT

## 2023-09-26 RX ORDER — CYCLOBENZAPRINE HCL 10 MG
10 TABLET ORAL 3 TIMES DAILY PRN
Qty: 21 TABLET | Refills: 0 | Status: SHIPPED | OUTPATIENT
Start: 2023-09-26

## 2023-09-26 ASSESSMENT — ENCOUNTER SYMPTOMS
SHORTNESS OF BREATH: 0
NERVOUS/ANXIOUS: 0
DYSURIA: 0
FEVER: 0
CHILLS: 0
VOMITING: 0
DIARRHEA: 0
LIGHT-HEADEDNESS: 0
ABDOMINAL PAIN: 0
NAUSEA: 0
CONSTIPATION: 0

## 2023-09-26 ASSESSMENT — PAIN SCALES - GENERAL: PAINLEVEL: SEVERE PAIN (6)

## 2023-09-26 NOTE — PROGRESS NOTES
Assessment & Plan     Back muscle spasm  Patient is a 67-year-old male who presents to clinic due to back pain that started after trying to catch a heavy cabinet yesterday.  Pain is located to the right side of the back, does not radiate, and is better with sitting/lying down.  Vital signs normal.  Physical exam significant for tenderness to palpation of right lumbar musculature.  Low suspicion for nerve compression as pain is not radicular and patient denies saddle anesthesia, loss of bowel/bladder function, and weakness.  Exam and symptoms are consistent with muscle spasm.  Recommended Flexeril, Tylenol/ibuprofen, heat/ice, and gentle activity such as walking.  Discussed expected course of recovery and return precautions.  - cyclobenzaprine (FLEXERIL) 10 MG tablet; Take 1 tablet (10 mg) by mouth 3 times daily as needed for muscle spasms    Encounter for vaccination  Patient is due for vaccinations today and is agreeable to receiving these.  - Pneumococcal 20 Valent Conjugate (PCV20)  - INFLUENZA VACCINE 65+ (FLUZONE HD)  - Tdap, tetanus-diptheria-acell pertussis, (BOOSTRIX) 5-2.5-18.5 LF-MCG/0.5 IRAIS injection; Inject 0.5 mLs into the muscle once for 1 dose  Prior to immunization administration, verified patients identity using patient s name and date of birth. Please see Immunization Activity for additional information.     Screening Questionnaire for Adult Immunization    Are you sick today?   No   Do you have allergies to medications, food, a vaccine component or latex?   No   Have you ever had a serious reaction after receiving a vaccination?   No   Do you have a long-term health problem with heart, lung, kidney, or metabolic disease (e.g., diabetes), asthma, a blood disorder, no spleen, complement component deficiency, a cochlear implant, or a spinal fluid leak?  Are you on long-term aspirin therapy?   No   Do you have cancer, leukemia, HIV/AIDS, or any other immune system problem?   No   Do you have a  parent, brother, or sister with an immune system problem?   No   In the past 3 months, have you taken medications that affect  your immune system, such as prednisone, other steroids, or anticancer drugs; drugs for the treatment of rheumatoid arthritis, Crohn s disease, or psoriasis; or have you had radiation treatments?   No   Have you had a seizure, or a brain or other nervous system problem?   No   During the past year, have you received a transfusion of blood or blood    products, or been given immune (gamma) globulin or antiviral drug?   No   For women: Are you pregnant or is there a chance you could become       pregnant during the next month?   No   Have you received any vaccinations in the past 4 weeks?   No     Immunization questionnaire answers were all negative.      Patient instructed to remain in clinic for 15 minutes afterwards, and to report any adverse reactions.     Screening performed by Kimberly Jade MA on 9/26/2023 at 5:11 PM.      See Patient Instructions    Mirian Serrano PA-C  [facility] TALIB Guidry is a 67 year old, presenting for the following health issues:  Spasms          9/26/2023     4:27 PM   Additional Questions   Roomed by Kimberly ANGELES MA   Accompanied by No one         9/26/2023     4:27 PM   Patient Reported Additional Medications   Patient reports taking the following new medications None       HPI     Patient notes he was moving cabinets yesterday and one was dropped. He tried to catch it causing right low back pain. Patient states when he puts his right foot forward to take a step it send his lower back into a spasm. Patient has to correct his stance with his left shoulder down and right shoulder up for it to stop. He stated that sitting and lying down is no problem but standing up straight or walking is when it is bothered. No radiation of pain, loss of bowel/bladder function, numbness or weakness in legs. Patient has been using Advil for management.  "      Review of Systems   Constitutional:  Negative for chills and fever.   HENT:  Negative for congestion.    Respiratory:  Negative for shortness of breath.    Cardiovascular:  Negative for chest pain.   Gastrointestinal:  Negative for abdominal pain, constipation, diarrhea, nausea and vomiting.   Genitourinary:  Negative for dysuria and urgency.   Skin:  Negative for rash.   Neurological:  Negative for light-headedness.   Psychiatric/Behavioral:  The patient is not nervous/anxious.             Objective    /86   Pulse 64   Temp 97.4  F (36.3  C) (Temporal)   Resp 16   Ht 1.795 m (5' 10.67\")   Wt 90.4 kg (199 lb 3.2 oz)   SpO2 98%   BMI 28.04 kg/m    Body mass index is 28.04 kg/m .  Physical Exam  Vitals and nursing note reviewed.   Constitutional:       General: He is not in acute distress.     Appearance: Normal appearance.   HENT:      Head: Normocephalic and atraumatic.   Eyes:      Extraocular Movements: Extraocular movements intact.      Pupils: Pupils are equal, round, and reactive to light.   Cardiovascular:      Rate and Rhythm: Normal rate.   Pulmonary:      Effort: Pulmonary effort is normal.   Musculoskeletal:         General: Normal range of motion.      Cervical back: Normal range of motion.      Comments: Tenderness to palpation of right lumbar musculature.  No tenderness palpation of lumbar spine.  Cautious gait.  Sensation intact and equal to bilateral lower extremities.   Skin:     General: Skin is warm and dry.   Neurological:      General: No focal deficit present.      Mental Status: He is alert.   Psychiatric:         Mood and Affect: Mood normal.         Behavior: Behavior normal.                              "

## 2023-09-26 NOTE — PATIENT INSTRUCTIONS
Your back pain is likely due to a muscle spasm in your back.  For treatment you can use the prescribed muscle relaxer, Flexeril.  Do not work or drive after taking this as it can make you drowsy.  You could also use heat/ice and Tylenol/ibuprofen.  Once your pain improves, please return to just using Tylenol.  Make sure you get up to walk every few hours during the day so that your spasm does not worsen.  Your symptoms should improve over the next week.    Your vaccines were updated today including your tetanus, flu, and pneumonia vaccines.  Reach out with questions or concerns.  Follow-up in clinic for new or worsening symptoms.

## 2023-09-26 NOTE — TELEPHONE ENCOUNTER
Pt calling to state that he has been experiencing back spasms that causes pain. Pt reported that pain/spasms are resolved when he lays down or is sitting. Pain only occurs when he is walking. Pt requesting appointment with Jeb QUIÑONEZ. Pt scheduled with same day provider 9/26/23 to be further evaluated. Pt advised to call clinic back if he experiences sharp shooting pain that radiates down legs or for worsening symptoms as he may need to be seen sooner. Pt verbalized good understanding and stated no further questions.     Jayleen Medellin RN on 9/26/2023 at 9:21 AM

## 2023-10-16 ENCOUNTER — OFFICE VISIT (OUTPATIENT)
Dept: FAMILY MEDICINE | Facility: CLINIC | Age: 68
End: 2023-10-16
Payer: COMMERCIAL

## 2023-10-16 VITALS
HEIGHT: 70 IN | HEART RATE: 65 BPM | OXYGEN SATURATION: 98 % | DIASTOLIC BLOOD PRESSURE: 72 MMHG | BODY MASS INDEX: 28.49 KG/M2 | WEIGHT: 199 LBS | TEMPERATURE: 97.4 F | SYSTOLIC BLOOD PRESSURE: 108 MMHG | RESPIRATION RATE: 24 BRPM

## 2023-10-16 DIAGNOSIS — R35.1 BENIGN PROSTATIC HYPERPLASIA WITH NOCTURIA: ICD-10-CM

## 2023-10-16 DIAGNOSIS — N40.1 BENIGN PROSTATIC HYPERPLASIA WITH NOCTURIA: ICD-10-CM

## 2023-10-16 DIAGNOSIS — B07.8 OTHER VIRAL WARTS: ICD-10-CM

## 2023-10-16 DIAGNOSIS — H04.123 DRY EYES: ICD-10-CM

## 2023-10-16 DIAGNOSIS — Z12.5 SCREENING FOR PROSTATE CANCER: ICD-10-CM

## 2023-10-16 DIAGNOSIS — R35.0 URINARY FREQUENCY: Primary | ICD-10-CM

## 2023-10-16 DIAGNOSIS — I25.110 CORONARY ARTERY DISEASE INVOLVING NATIVE CORONARY ARTERY OF NATIVE HEART WITH UNSTABLE ANGINA PECTORIS (H): ICD-10-CM

## 2023-10-16 DIAGNOSIS — L57.0 ACTINIC KERATOSIS: ICD-10-CM

## 2023-10-16 LAB
ALBUMIN UR-MCNC: NEGATIVE MG/DL
APPEARANCE UR: CLEAR
BILIRUB UR QL STRIP: NEGATIVE
COLOR UR AUTO: YELLOW
GLUCOSE UR STRIP-MCNC: NEGATIVE MG/DL
HGB UR QL STRIP: NEGATIVE
KETONES UR STRIP-MCNC: NEGATIVE MG/DL
LEUKOCYTE ESTERASE UR QL STRIP: NEGATIVE
NITRATE UR QL: NEGATIVE
PH UR STRIP: 5 [PH] (ref 5–7)
SP GR UR STRIP: >=1.03 (ref 1–1.03)
UROBILINOGEN UR STRIP-ACNC: 0.2 E.U./DL

## 2023-10-16 PROCEDURE — 17000 DESTRUCT PREMALG LESION: CPT | Performed by: PHYSICIAN ASSISTANT

## 2023-10-16 PROCEDURE — 36415 COLL VENOUS BLD VENIPUNCTURE: CPT | Performed by: PHYSICIAN ASSISTANT

## 2023-10-16 PROCEDURE — 17003 DESTRUCT PREMALG LES 2-14: CPT | Performed by: PHYSICIAN ASSISTANT

## 2023-10-16 PROCEDURE — 99214 OFFICE O/P EST MOD 30 MIN: CPT | Mod: 25 | Performed by: PHYSICIAN ASSISTANT

## 2023-10-16 PROCEDURE — 93000 ELECTROCARDIOGRAM COMPLETE: CPT | Mod: 59 | Performed by: PHYSICIAN ASSISTANT

## 2023-10-16 PROCEDURE — 80048 BASIC METABOLIC PNL TOTAL CA: CPT | Performed by: PHYSICIAN ASSISTANT

## 2023-10-16 PROCEDURE — 17110 DESTRUCTION B9 LES UP TO 14: CPT | Mod: 59 | Performed by: PHYSICIAN ASSISTANT

## 2023-10-16 PROCEDURE — 81003 URINALYSIS AUTO W/O SCOPE: CPT | Performed by: PHYSICIAN ASSISTANT

## 2023-10-16 PROCEDURE — G0103 PSA SCREENING: HCPCS | Performed by: PHYSICIAN ASSISTANT

## 2023-10-16 RX ORDER — TAMSULOSIN HYDROCHLORIDE 0.4 MG/1
0.4 CAPSULE ORAL DAILY
Qty: 90 CAPSULE | Refills: 1 | Status: SHIPPED | OUTPATIENT
Start: 2023-10-16

## 2023-10-16 ASSESSMENT — PAIN SCALES - GENERAL: PAINLEVEL: NO PAIN (0)

## 2023-10-16 NOTE — PROGRESS NOTES
"    Criss Guidry is a 67 year old, presenting for the following health issues:  Urinary Problem (Concerns about prostate./Unable to hold bladder./X 1 mos) and Health Maintenance (Patient is not fasting)        10/16/2023     4:00 PM   Additional Questions   Roomed by Mandy Jones CMA   Accompanied by None         10/16/2023     4:00 PM   Patient Reported Additional Medications   Patient reports taking the following new medications none       History of Present Illness       Reason for visit:  Check prostate  Symptom onset:  3-4 weeks ago  Symptoms include:  Cant hold urine    He eats 2-3 servings of fruits and vegetables daily.He consumes 1 sweetened beverage(s) daily.He exercises with enough effort to increase his heart rate 60 or more minutes per day.  He exercises with enough effort to increase his heart rate 5 days per week.   He is taking medications regularly.     Some nocturia. Some daytime frequency. No enuresis.  Some slowing of urine stream.     Dry eye issues.      Review of Systems   Constitutional, HEENT, cardiovascular, pulmonary, GI, , musculoskeletal, neuro, skin, endocrine and psych systems are negative, except as otherwise noted.      Objective    /72   Pulse 65   Temp 97.4  F (36.3  C) (Temporal)   Resp 24   Ht 1.784 m (5' 10.25\")   Wt 90.3 kg (199 lb)   SpO2 98%   BMI 28.35 kg/m    Body mass index is 28.35 kg/m .  Physical Exam   3 ak lesions on his face.  Each lesion tx'd with 2 ftc's of cryotherapy.  Wart on the ventral aspect of his left middle finger and one on the plantar aspect of his left foot.   Pared each down and tx'd with 3 ftc's of cryotherapy.   Eye exam - right eye normal lid, conjunctiva, cornea, pupil and fundus, left eye normal lid, conjunctiva, cornea, pupil and fundus.  Thyroid not palpable, not enlarged, no nodules detected.  CHEST:chest clear to IPPA, no tachypnea, retractions or cyanosis, and S1, S2 normal, no murmur, no gallop, rate regular.  The " abdomen is soft without tenderness, guarding, mass, rebound or organomegaly. Bowel sounds are normal. No CVA tenderness or inguinal adenopathy noted.      Chao was seen today for urinary problem and health maintenance.    Diagnoses and all orders for this visit:    Urinary frequency  -     UA Macroscopic with reflex to Microscopic and Culture - Lab Collect; Future  -     PSA, screen; Future  -     Basic metabolic panel  (Ca, Cl, CO2, Creat, Gluc, K, Na, BUN); Future  -     UA Macroscopic with reflex to Microscopic and Culture - Lab Collect  -     PSA, screen  -     Basic metabolic panel  (Ca, Cl, CO2, Creat, Gluc, K, Na, BUN)  -     tamsulosin (FLOMAX) 0.4 MG capsule; Take 1 capsule (0.4 mg) by mouth daily    Coronary artery disease involving native coronary artery of native heart with unstable angina pectoris (H)  -     EKG 12-lead complete w/read - Clinics    Screening for prostate cancer  -     PSA, screen; Future  -     PSA, screen    Dry eyes  -     polyethylene glycol-propylene glycol (SYSTANE ULTRA) 0.4-0.3 % SOLN ophthalmic solution; Place 1 drop into both eyes every hour as needed for dry eyes    Other viral warts  -     DESTRUCT BENIGN LESION, UP TO 14    Actinic keratosis  -     DESTRUCT BENIGN LESION, UP TO 14    Benign prostatic hyperplasia with nocturia  -     tamsulosin (FLOMAX) 0.4 MG capsule; Take 1 capsule (0.4 mg) by mouth daily      Advised supportive and symptomatic treatment.  Follow up with Provider - if condition persists or worsens.   work on lifestyle modification

## 2023-10-17 LAB
ANION GAP SERPL CALCULATED.3IONS-SCNC: 11 MMOL/L (ref 7–15)
BUN SERPL-MCNC: 19.1 MG/DL (ref 8–23)
CALCIUM SERPL-MCNC: 8.8 MG/DL (ref 8.8–10.2)
CHLORIDE SERPL-SCNC: 103 MMOL/L (ref 98–107)
CREAT SERPL-MCNC: 0.96 MG/DL (ref 0.67–1.17)
DEPRECATED HCO3 PLAS-SCNC: 24 MMOL/L (ref 22–29)
EGFRCR SERPLBLD CKD-EPI 2021: 86 ML/MIN/1.73M2
GLUCOSE SERPL-MCNC: 96 MG/DL (ref 70–99)
POTASSIUM SERPL-SCNC: 4 MMOL/L (ref 3.4–5.3)
PSA SERPL DL<=0.01 NG/ML-MCNC: 0.73 NG/ML (ref 0–4.5)
SODIUM SERPL-SCNC: 138 MMOL/L (ref 135–145)

## 2023-11-29 ENCOUNTER — MYC REFILL (OUTPATIENT)
Dept: CARDIOLOGY | Facility: CLINIC | Age: 68
End: 2023-11-29
Payer: COMMERCIAL

## 2023-11-29 DIAGNOSIS — I10 PRIMARY HYPERTENSION: ICD-10-CM

## 2023-11-29 DIAGNOSIS — I25.110 CORONARY ARTERY DISEASE INVOLVING NATIVE CORONARY ARTERY OF NATIVE HEART WITH UNSTABLE ANGINA PECTORIS (H): ICD-10-CM

## 2023-11-30 RX ORDER — LOSARTAN POTASSIUM 25 MG/1
25 TABLET ORAL DAILY
Qty: 90 TABLET | Refills: 0 | Status: SHIPPED | OUTPATIENT
Start: 2023-11-30 | End: 2024-03-13

## 2024-03-13 DIAGNOSIS — I25.110 CORONARY ARTERY DISEASE INVOLVING NATIVE CORONARY ARTERY OF NATIVE HEART WITH UNSTABLE ANGINA PECTORIS (H): ICD-10-CM

## 2024-03-13 DIAGNOSIS — I10 PRIMARY HYPERTENSION: ICD-10-CM

## 2024-03-15 RX ORDER — LOSARTAN POTASSIUM 25 MG/1
25 TABLET ORAL DAILY
Qty: 90 TABLET | Refills: 1 | Status: SHIPPED | OUTPATIENT
Start: 2024-03-15 | End: 2024-09-09

## 2024-03-15 NOTE — TELEPHONE ENCOUNTER
losartan (COZAAR) 25 MG tablet 90 tablet 0 11/30/2023       Last Office Visit: 9/13/23  Future Office visit:   none      Angiotensin-II Receptors Passed        Amira Rice RN  P Red Flag Triage/MRT

## 2024-03-16 ENCOUNTER — HEALTH MAINTENANCE LETTER (OUTPATIENT)
Age: 69
End: 2024-03-16

## 2024-04-04 DIAGNOSIS — I25.110 CORONARY ARTERY DISEASE INVOLVING NATIVE CORONARY ARTERY OF NATIVE HEART WITH UNSTABLE ANGINA PECTORIS (H): ICD-10-CM

## 2024-04-04 RX ORDER — ATORVASTATIN CALCIUM 80 MG/1
80 TABLET, FILM COATED ORAL DAILY
Qty: 90 TABLET | Refills: 1 | OUTPATIENT
Start: 2024-04-04

## 2024-04-04 NOTE — TELEPHONE ENCOUNTER
Tried to call patient to inform  him that he has refills on file at local [facility] pharmacy.     Current number listed [phone] is no longer in working order.     Does not appear to use [employer].     Refused refill for [place] pharmacy. Will wait for patient to call the clinic.     Melita Crandall RN BSN  [facility]

## 2024-04-08 ENCOUNTER — TELEPHONE (OUTPATIENT)
Dept: FAMILY MEDICINE | Facility: CLINIC | Age: 69
End: 2024-04-08
Payer: COMMERCIAL

## 2024-04-08 NOTE — TELEPHONE ENCOUNTER
Patient Quality Outreach    Patient is due for the following:   Physical Annual Wellness Visit    Next Steps:   Schedule a Annual Wellness Visit with fasting blood work      Type of outreach:    Sent [employer] message.      Questions for provider review:    None           Mandy Jones

## 2024-09-09 ENCOUNTER — TELEPHONE (OUTPATIENT)
Dept: FAMILY MEDICINE | Facility: CLINIC | Age: 69
End: 2024-09-09
Payer: COMMERCIAL

## 2024-09-09 ENCOUNTER — MYC REFILL (OUTPATIENT)
Dept: CARDIOLOGY | Facility: CLINIC | Age: 69
End: 2024-09-09
Payer: COMMERCIAL

## 2024-09-09 DIAGNOSIS — I25.110 CORONARY ARTERY DISEASE INVOLVING NATIVE CORONARY ARTERY OF NATIVE HEART WITH UNSTABLE ANGINA PECTORIS (H): ICD-10-CM

## 2024-09-09 DIAGNOSIS — I10 PRIMARY HYPERTENSION: ICD-10-CM

## 2024-09-09 RX ORDER — LOSARTAN POTASSIUM 25 MG/1
25 TABLET ORAL DAILY
Qty: 90 TABLET | Refills: 1 | Status: SHIPPED | OUTPATIENT
Start: 2024-09-09

## 2024-09-09 NOTE — TELEPHONE ENCOUNTER
Prior Authorization Retail Medication Request    Medication/Dose: atorvastatin (LIPITOR) 80 MG tablet  Diagnosis and ICD code (if different than what is on RX):  I25.110   New/renewal/insurance change PA/secondary ins. PA:  Previously Tried and Failed:  na  Rationale:  na    Insurance   Primary: [employer]  Insurance ID:  [id number]     Secondary (if applicable):bari  Insurance ID:  bari    Pharmacy Information (if different than what is on RX)  Name:   [facility]  Phone:  [phone]  Fax:      [phone]

## 2025-03-04 DIAGNOSIS — I25.110 CORONARY ARTERY DISEASE INVOLVING NATIVE CORONARY ARTERY OF NATIVE HEART WITH UNSTABLE ANGINA PECTORIS (H): ICD-10-CM

## 2025-03-04 RX ORDER — ATORVASTATIN CALCIUM 80 MG/1
80 TABLET, FILM COATED ORAL DAILY
Qty: 90 TABLET | Refills: 1 | Status: SHIPPED | OUTPATIENT
Start: 2025-03-04

## 2025-03-11 DIAGNOSIS — I25.110 CORONARY ARTERY DISEASE INVOLVING NATIVE CORONARY ARTERY OF NATIVE HEART WITH UNSTABLE ANGINA PECTORIS (H): ICD-10-CM

## 2025-03-11 DIAGNOSIS — I10 PRIMARY HYPERTENSION: ICD-10-CM

## 2025-03-11 NOTE — TELEPHONE ENCOUNTER
"Last Written Prescription:9/9/24 #90/1 RF     losartan (COZAAR) 25 MG tablet 90 tablet 1 9/9/2024 -- No   Sig - Route: Take 1 tablet (25 mg) by mouth daily. - Oral     ----------------------  Last Visit Date: 9/13/23  \" Plan:  - Continue losartan 25 mg once daily  - Continue ASA 81 mg q24h lifelong   - Continue atorvastatin 80 mg q24h   - No further aortic surveillance needed given that the ascending aorta is normal in caliber when indexed to BSA  - RTC as needed, given his impending move to [place]\"  Future Visit Date: None  ----------------------             Refill decision: Medication unable to be refilled by RN due to: Pt not seen within past 12 months, No FOV or FOV exceeds timeframe per protocol    Last seen 9/13/23, and moved to [place]- at last refill, was working on [facility]( 9/9/24 [facility])      Request from pharmacy:  Requested Prescriptions   Pending Prescriptions Disp Refills    losartan (COZAAR) 25 MG tablet 90 tablet 1     Sig: Take 1 tablet (25 mg) by mouth daily.       Angiotensin-II Receptors Failed - 3/11/2025 11:00 AM        Failed - Most recent blood pressure under 140/90 in past 12 months     BP Readings from Last 3 Encounters:   10/16/23 108/72   09/26/23 122/86   09/13/23 129/74       No data recorded            Failed - Has GFR on file in past 12 months and most recent value is normal        Failed - Recent (12 mo) or future (90days) visit within the authorizing provider's specialty     The patient must have completed an in-person or virtual visit within the past 12 months or has a future visit scheduled within the next 90 days with the authorizing provider s specialty.  Urgent care and e-visits do not qualify as an office visit for this protocol.          Failed - Normal serum potassium on file in past 12 months     Recent Labs   Lab Test 10/16/23  1642   POTASSIUM 4.0                    Passed - Medication is active on med list and the sig matches. RN to manually verify dose and sig if " red X/fail.     If the protocol passes (green check), you do not need to verify med dose and sig.    A prescription matches if they are the same clinical intention.    For Example: once daily and every morning are the same.    The protocol can not identify upper and lower case letters as matching and will fail.     For Example: Take 1 tablet (50 mg) by mouth daily     TAKE 1 TABLET (50 MG) BY MOUTH DAILY    For all fails (red x), verify dose and sig.    If the refill does match what is on file, the RN can still proceed to approve the refill request.       If they do not match, route to the appropriate provider.             Passed - Medication indicated for associated diagnosis     The medication is prescribed for one or more of the following conditions:    Chronic Kidney Disease (CDK)    Heart Failure (HF)    Diabetes, Nephropathy   Hypertension    Coronary Artery Disease (CAD)   Raynaud's Disease   Ischemic cardiomyopathy   Cardiomyopathy   Pulmonary Hypertension          Passed - Patient is age 18 or older

## 2025-03-12 RX ORDER — LOSARTAN POTASSIUM 25 MG/1
25 TABLET ORAL DAILY
Qty: 90 TABLET | Refills: 0 | Status: SHIPPED | OUTPATIENT
Start: 2025-03-12

## 2025-03-22 ENCOUNTER — HEALTH MAINTENANCE LETTER (OUTPATIENT)
Age: 70
End: 2025-03-22

## 2025-05-13 ENCOUNTER — TELEPHONE (OUTPATIENT)
Dept: FAMILY MEDICINE | Facility: CLINIC | Age: 70
End: 2025-05-13
Payer: COMMERCIAL

## 2025-06-25 ENCOUNTER — TELEPHONE (OUTPATIENT)
Dept: CARDIOLOGY | Facility: CLINIC | Age: 70
End: 2025-06-25
Payer: COMMERCIAL

## 2025-06-25 DIAGNOSIS — I10 PRIMARY HYPERTENSION: ICD-10-CM

## 2025-06-25 DIAGNOSIS — I25.110 CORONARY ARTERY DISEASE INVOLVING NATIVE CORONARY ARTERY OF NATIVE HEART WITH UNSTABLE ANGINA PECTORIS (H): ICD-10-CM

## 2025-06-25 RX ORDER — LOSARTAN POTASSIUM 25 MG/1
25 TABLET ORAL DAILY
Qty: 30 TABLET | Refills: 0 | Status: SHIPPED | OUTPATIENT
Start: 2025-06-25

## 2025-06-25 NOTE — TELEPHONE ENCOUNTER
Per Epic notes pt was considering moving to [place]. Jayleen refill sent, clinic staff notify to contact patient to arrange clinic follow-up if still in [place].       Requested Prescriptions   Pending Prescriptions Disp Refills    losartan (COZAAR) 25 MG tablet 90 tablet 0     Sig: Take 1 tablet (25 mg) by mouth daily.       Angiotensin-II Receptors Failed - 6/25/2025 12:45 PM        Failed - Most recent blood pressure under 140/90 in past 12 months     BP Readings from Last 3 Encounters:   10/16/23 108/72   09/26/23 122/86   09/13/23 129/74       No data recorded            Failed - Has GFR on file in past 12 months and most recent value is normal        Failed - Recent (12 month) or future (90 days) visit with authorizing provider's specialty (provided they have been seen in the past 15 months)     The patient must have completed an in-person or virtual visit within the past 12 months or has a future visit scheduled within the next 90 days with the authorizing provider s specialty.  Urgent care and e-visits do not qualify as an office visit for this protocol.          Failed - Normal serum potassium on file in past 12 months     Recent Labs   Lab Test 10/16/23  1642   POTASSIUM 4.0                    Passed - Medication is active on med list and the sig matches. RN to manually verify dose and sig if red X/fail.     If the protocol passes (green check), you do not need to verify med dose and sig.    A prescription matches if they are the same clinical intention.    For Example: once daily and every morning are the same.    The protocol can not identify upper and lower case letters as matching and will fail.     For Example: Take 1 tablet (50 mg) by mouth daily     TAKE 1 TABLET (50 MG) BY MOUTH DAILY    For all fails (red x), verify dose and sig.    If the refill does match what is on file, the RN can still proceed to approve the refill request.       If they do not match, route to the appropriate provider.              Passed - Medication indicated for associated diagnosis     The medication is prescribed for one or more of the following conditions:    Chronic Kidney Disease (CDK)    Heart Failure (HF)    Diabetes, Nephropathy   Hypertension    Coronary Artery Disease (CAD)   Raynaud's Disease   Ischemic cardiomyopathy   Cardiomyopathy   Pulmonary Hypertension          Passed - Patient is age 18 or older

## 2025-06-25 NOTE — TELEPHONE ENCOUNTER
Last Written Prescription:  losartan (COZAAR) 25 MG tablet 90 tablet 0 3/12/2025 -- No   Sig - Route: Take 1 tablet (25 mg) by mouth daily. - Oral   Sent to pharmacy as: Losartan Potassium 25 MG Oral Tablet (COZAAR)   Class: E-Prescribe   Order: [id number]   E-Prescribing Status: Receip     ----------------------  Last Visit Date: 9/13/23  Future Visit Date: 0  ----------------------        Refill decision: Medication unable to be refilled by RN due to: Overdue labs/test:      Last Comprehensive Metabolic Panel:  Lab Results   Component Value Date     10/16/2023    POTASSIUM 4.0 10/16/2023    CHLORIDE 103 10/16/2023    CO2 24 10/16/2023    ANIONGAP 11 10/16/2023    GLC 96 10/16/2023    BUN 19.1 10/16/2023    CR 0.96 10/16/2023    GFRESTIMATED 86 10/16/2023    NICKY 8.8 10/16/2023           Request from pharmacy:  Requested Prescriptions   Pending Prescriptions Disp Refills    losartan (COZAAR) 25 MG tablet 90 tablet 0     Sig: Take 1 tablet (25 mg) by mouth daily.       Angiotensin-II Receptors Failed - 6/25/2025 12:26 PM        Failed - Most recent blood pressure under 140/90 in past 12 months     BP Readings from Last 3 Encounters:   10/16/23 108/72   09/26/23 122/86   09/13/23 129/74       No data recorded            Failed - Has GFR on file in past 12 months and most recent value is normal        Failed - Recent (12 month) or future (90 days) visit with authorizing provider's specialty (provided they have been seen in the past 15 months)     The patient must have completed an in-person or virtual visit within the past 12 months or has a future visit scheduled within the next 90 days with the authorizing provider s specialty.  Urgent care and e-visits do not qualify as an office visit for this protocol.          Failed - Normal serum potassium on file in past 12 months     Recent Labs   Lab Test 10/16/23  1642   POTASSIUM 4.0                    Passed - Medication is active on med list and the sig matches. RN  to manually verify dose and sig if red X/fail.     If the protocol passes (green check), you do not need to verify med dose and sig.    A prescription matches if they are the same clinical intention.    For Example: once daily and every morning are the same.    The protocol can not identify upper and lower case letters as matching and will fail.     For Example: Take 1 tablet (50 mg) by mouth daily     TAKE 1 TABLET (50 MG) BY MOUTH DAILY    For all fails (red x), verify dose and sig.    If the refill does match what is on file, the RN can still proceed to approve the refill request.       If they do not match, route to the appropriate provider.             Passed - Medication indicated for associated diagnosis     The medication is prescribed for one or more of the following conditions:    Chronic Kidney Disease (CDK)    Heart Failure (HF)    Diabetes, Nephropathy   Hypertension    Coronary Artery Disease (CAD)   Raynaud's Disease   Ischemic cardiomyopathy   Cardiomyopathy   Pulmonary Hypertension          Passed - Patient is age 18 or older

## 2025-06-25 NOTE — TELEPHONE ENCOUNTER
Spoke with patient, confirms that he moved to [place]. Would like to continue seeing Dr. Bansal but will not be back until next June.

## 2025-08-15 DIAGNOSIS — I10 PRIMARY HYPERTENSION: ICD-10-CM

## 2025-08-15 DIAGNOSIS — I25.110 CORONARY ARTERY DISEASE INVOLVING NATIVE CORONARY ARTERY OF NATIVE HEART WITH UNSTABLE ANGINA PECTORIS (H): ICD-10-CM

## 2025-08-25 RX ORDER — LOSARTAN POTASSIUM 25 MG/1
25 TABLET ORAL DAILY
Qty: 30 TABLET | Refills: 0 | OUTPATIENT
Start: 2025-08-25

## (undated) DEVICE — VALVE HEMOSTASIS .096" COPILOT MECH 1003331

## (undated) DEVICE — GW VASC .035IN DIA 260CML 7CML 3 MM RADIUS J CURVE 502455

## (undated) DEVICE — TUBING PRESSURE 30"

## (undated) DEVICE — CATH ANGIO SUPERTORQUE PLUS JR4 6FRX100CM 533621

## (undated) DEVICE — KIT ACCESSORY INTRO INFLATION SYS 20/30 PRIORITY 1000186-115

## (undated) DEVICE — LEFT HEART PK FAIRVIEW UNIV MEDICAL CENTER

## (undated) DEVICE — MANIFOLD KIT ANGIO AUTOMATED 014613

## (undated) DEVICE — SHTH INTRO 0.021IN ID 6FR DIA

## (undated) DEVICE — CATH BALLOON EMERGE 2.5X12MM H7493918912250

## (undated) DEVICE — CATH GUIDING BLUE YELLOW PTFE XB3.5 6FRX100CM 67005400

## (undated) DEVICE — SLEEVE TR BAND RADIAL COMPRESSION DEVICE 24CM TRB24-REG

## (undated) DEVICE — CATH BALLOON NC EMERGE 4.00X20MM H7493926720400

## (undated) DEVICE — SOL WATER IRRIG 1000ML BOTTLE 07139-09

## (undated) DEVICE — CATH BALLOON NC EMERGE 2.50X12MM H7493926712250

## (undated) DEVICE — CATH BALLOON NC EMERGE 2.75X30MM H7493926730270

## (undated) DEVICE — STATLOCK PSI DEVICE

## (undated) DEVICE — CATH BALLOON NC EMERGE 3.50X20MM H7493926720350

## (undated) DEVICE — KIT HAND CONTROL ACIST 014644 AR-P54

## (undated) DEVICE — GUIDEWIRE VASC 0.014INX180CM RUNTHROUGH 25-1011

## (undated) DEVICE — CATH BALLOON EMERGE 2.0X12MM H7493918912200

## (undated) DEVICE — CATH ANGIO SUPERTORQUE PLUS JL4 6FRX100CM 533620

## (undated) RX ORDER — FENTANYL CITRATE 50 UG/ML
INJECTION, SOLUTION INTRAMUSCULAR; INTRAVENOUS
Status: DISPENSED
Start: 2021-07-23

## (undated) RX ORDER — LIDOCAINE HYDROCHLORIDE 10 MG/ML
INJECTION, SOLUTION EPIDURAL; INFILTRATION; INTRACAUDAL; PERINEURAL
Status: DISPENSED
Start: 2021-07-23

## (undated) RX ORDER — SODIUM CHLORIDE 9 MG/ML
INJECTION, SOLUTION INTRAVENOUS
Status: DISPENSED
Start: 2021-07-23

## (undated) RX ORDER — HEPARIN SODIUM 1000 [USP'U]/ML
INJECTION, SOLUTION INTRAVENOUS; SUBCUTANEOUS
Status: DISPENSED
Start: 2021-07-23

## (undated) RX ORDER — FENTANYL CITRATE 50 UG/ML
INJECTION, SOLUTION INTRAMUSCULAR; INTRAVENOUS
Status: DISPENSED
Start: 2023-05-09

## (undated) RX ORDER — NICARDIPINE HCL-0.9% SOD CHLOR 1 MG/10 ML
SYRINGE (ML) INTRAVENOUS
Status: DISPENSED
Start: 2021-07-23

## (undated) RX ORDER — NITROGLYCERIN 5 MG/ML
VIAL (ML) INTRAVENOUS
Status: DISPENSED
Start: 2021-07-23